# Patient Record
Sex: FEMALE | Race: WHITE | Employment: UNEMPLOYED | URBAN - METROPOLITAN AREA
[De-identification: names, ages, dates, MRNs, and addresses within clinical notes are randomized per-mention and may not be internally consistent; named-entity substitution may affect disease eponyms.]

---

## 2017-01-06 PROBLEM — N63.0 BREAST MASS IN FEMALE: Status: ACTIVE | Noted: 2017-01-06

## 2017-01-06 PROBLEM — N81.6 RECTOCELE: Status: ACTIVE | Noted: 2017-01-06

## 2017-01-10 ENCOUNTER — HOSPITAL ENCOUNTER (OUTPATIENT)
Dept: MAMMOGRAPHY | Age: 57
Discharge: HOME | End: 2017-01-10
Attending: OBSTETRICS & GYNECOLOGY
Payer: COMMERCIAL

## 2017-01-10 DIAGNOSIS — N64.4 BREAST TENDERNESS: ICD-10-CM

## 2017-01-10 DIAGNOSIS — N63.10 BREAST MASS, RIGHT: ICD-10-CM

## 2017-01-10 PROCEDURE — 76642 ULTRASOUND BREAST LIMITED: CPT

## 2017-01-10 PROCEDURE — 77066 DX MAMMO INCL CAD BI: CPT

## 2017-01-19 ENCOUNTER — HOSPITAL ENCOUNTER (OUTPATIENT)
Dept: MRI IMAGING | Age: 57
Discharge: HOME | End: 2017-01-19
Attending: OBSTETRICS & GYNECOLOGY
Payer: COMMERCIAL

## 2017-01-19 VITALS — BODY MASS INDEX: 20.67 KG/M2 | WEIGHT: 132 LBS

## 2017-01-19 DIAGNOSIS — N63.10 BREAST MASS, RIGHT: ICD-10-CM

## 2017-01-19 DIAGNOSIS — Z80.3 FAMILY HISTORY OF BREAST CANCER: ICD-10-CM

## 2017-01-19 DIAGNOSIS — N64.4 BREAST TENDERNESS: ICD-10-CM

## 2017-01-19 PROCEDURE — 77059 MRI BREAST BI W WO CONT: CPT

## 2017-01-19 PROCEDURE — 74011000258 HC RX REV CODE- 258: Performed by: OBSTETRICS & GYNECOLOGY

## 2017-01-19 PROCEDURE — A9577 INJ MULTIHANCE: HCPCS | Performed by: OBSTETRICS & GYNECOLOGY

## 2017-01-19 PROCEDURE — 74011250636 HC RX REV CODE- 250/636: Performed by: OBSTETRICS & GYNECOLOGY

## 2017-01-19 RX ORDER — SODIUM CHLORIDE 0.9 % (FLUSH) 0.9 %
10 SYRINGE (ML) INJECTION
Status: COMPLETED | OUTPATIENT
Start: 2017-01-19 | End: 2017-01-19

## 2017-01-19 RX ADMIN — SODIUM CHLORIDE 100 ML: 900 INJECTION, SOLUTION INTRAVENOUS at 10:00

## 2017-01-19 RX ADMIN — Medication 10 ML: at 10:00

## 2017-01-19 RX ADMIN — GADOBENATE DIMEGLUMINE 12 ML: 529 INJECTION, SOLUTION INTRAVENOUS at 10:00

## 2017-08-22 PROBLEM — N81.2 UTEROVAGINAL PROLAPSE, INCOMPLETE: Status: ACTIVE | Noted: 2017-08-22

## 2018-12-27 ENCOUNTER — HOSPITAL ENCOUNTER (OUTPATIENT)
Dept: MAMMOGRAPHY | Age: 58
Discharge: HOME | End: 2018-12-27
Attending: OBSTETRICS & GYNECOLOGY
Payer: COMMERCIAL

## 2018-12-27 DIAGNOSIS — Z12.31 ENCOUNTER FOR SCREENING MAMMOGRAM FOR MALIGNANT NEOPLASM OF BREAST: ICD-10-CM

## 2018-12-27 PROCEDURE — 77067 SCR MAMMO BI INCL CAD: CPT

## 2018-12-31 PROBLEM — K21.9 GERD (GASTROESOPHAGEAL REFLUX DISEASE): Status: ACTIVE | Noted: 2018-12-31

## 2018-12-31 PROBLEM — M13.0 POLYARTHRITIS: Status: ACTIVE | Noted: 2018-12-31

## 2018-12-31 PROBLEM — G44.229 CHRONIC TENSION-TYPE HEADACHE, NOT INTRACTABLE: Status: ACTIVE | Noted: 2018-12-31

## 2018-12-31 PROBLEM — L70.8 OTHER ACNE: Status: ACTIVE | Noted: 2018-12-31

## 2019-08-15 PROBLEM — G47.30 SLEEP APNEA: Status: ACTIVE | Noted: 2019-08-15

## 2020-11-17 ENCOUNTER — TRANSCRIBE ORDER (OUTPATIENT)
Dept: SCHEDULING | Age: 60
End: 2020-11-17

## 2020-11-17 DIAGNOSIS — Z12.31 SCREENING MAMMOGRAM FOR HIGH-RISK PATIENT: Primary | ICD-10-CM

## 2021-03-29 ENCOUNTER — HOSPITAL ENCOUNTER (OUTPATIENT)
Dept: MAMMOGRAPHY | Age: 61
Discharge: HOME | End: 2021-03-29
Attending: OBSTETRICS & GYNECOLOGY
Payer: COMMERCIAL

## 2021-03-29 DIAGNOSIS — Z12.31 SCREENING MAMMOGRAM FOR HIGH-RISK PATIENT: ICD-10-CM

## 2021-03-29 PROCEDURE — 77063 BREAST TOMOSYNTHESIS BI: CPT

## 2021-03-31 ENCOUNTER — APPOINTMENT (RX ONLY)
Dept: URBAN - METROPOLITAN AREA CLINIC 23 | Facility: CLINIC | Age: 61
Setting detail: DERMATOLOGY
End: 2021-03-31

## 2021-03-31 DIAGNOSIS — D18.0 HEMANGIOMA: ICD-10-CM

## 2021-03-31 DIAGNOSIS — Z71.89 OTHER SPECIFIED COUNSELING: ICD-10-CM

## 2021-03-31 DIAGNOSIS — Z87.2 PERSONAL HISTORY OF DISEASES OF THE SKIN AND SUBCUTANEOUS TISSUE: ICD-10-CM

## 2021-03-31 DIAGNOSIS — L94.0 LOCALIZED SCLERODERMA [MORPHEA]: ICD-10-CM

## 2021-03-31 DIAGNOSIS — Z48.02 ENCOUNTER FOR REMOVAL OF SUTURES: ICD-10-CM

## 2021-03-31 DIAGNOSIS — L57.8 OTHER SKIN CHANGES DUE TO CHRONIC EXPOSURE TO NONIONIZING RADIATION: ICD-10-CM

## 2021-03-31 PROBLEM — L70.0 ACNE VULGARIS: Status: ACTIVE | Noted: 2021-03-31

## 2021-03-31 PROBLEM — K21.9 GASTRO-ESOPHAGEAL REFLUX DISEASE WITHOUT ESOPHAGITIS: Status: ACTIVE | Noted: 2021-03-31

## 2021-03-31 PROBLEM — D18.01 HEMANGIOMA OF SKIN AND SUBCUTANEOUS TISSUE: Status: ACTIVE | Noted: 2021-03-31

## 2021-03-31 PROBLEM — M12.9 ARTHROPATHY, UNSPECIFIED: Status: ACTIVE | Noted: 2021-03-31

## 2021-03-31 PROCEDURE — 99204 OFFICE O/P NEW MOD 45 MIN: CPT

## 2021-03-31 PROCEDURE — ? PRESCRIPTION

## 2021-03-31 PROCEDURE — ? SUNSCREEN RECOMMENDATIONS

## 2021-03-31 PROCEDURE — ? COUNSELING

## 2021-03-31 RX ORDER — TACROLIMUS 1 MG/G
OINTMENT TOPICAL
Qty: 1 | Refills: 2 | Status: ERX | COMMUNITY
Start: 2021-03-31

## 2021-03-31 RX ADMIN — TACROLIMUS: 1 OINTMENT TOPICAL at 00:00

## 2021-03-31 ASSESSMENT — LOCATION DETAILED DESCRIPTION DERM
LOCATION DETAILED: INFERIOR THORACIC SPINE
LOCATION DETAILED: RIGHT LATERAL SUPERIOR CHEST
LOCATION DETAILED: RIGHT POSTERIOR SHOULDER
LOCATION DETAILED: LEFT MEDIAL UPPER BACK
LOCATION DETAILED: LEFT ULNAR PALM
LOCATION DETAILED: PERIUMBILICAL SKIN
LOCATION DETAILED: LEFT POSTERIOR SHOULDER

## 2021-03-31 ASSESSMENT — LOCATION SIMPLE DESCRIPTION DERM
LOCATION SIMPLE: LEFT UPPER BACK
LOCATION SIMPLE: RIGHT SHOULDER
LOCATION SIMPLE: UPPER BACK
LOCATION SIMPLE: LEFT SHOULDER
LOCATION SIMPLE: CHEST
LOCATION SIMPLE: ABDOMEN
LOCATION SIMPLE: LEFT HAND

## 2021-03-31 ASSESSMENT — LOCATION ZONE DERM
LOCATION ZONE: HAND
LOCATION ZONE: TRUNK
LOCATION ZONE: ARM

## 2021-03-31 NOTE — PROCEDURE: COUNSELING
Detail Level: Detailed
Detail Level: Simple
Detail Level: Zone
Sunscreen Recommendations: Broad Spectrum

## 2021-03-31 NOTE — HPI: RASH
What Type Of Note Output Would You Prefer (Optional)?: Standard Output
How Severe Is Your Rash?: moderate
Is This A New Presentation, Or A Follow-Up?: Rash
Additional History: Has used Elidel, and Propiogenta

## 2021-03-31 NOTE — PROCEDURE: REASSURANCE
Additional Notes (Optional): She had carpal tunnel surgery less than a month ago in [facility]. I reassured her.
Detail Level: Detailed
Hide Additional Notes?: No

## 2021-04-05 ENCOUNTER — HOSPITAL ENCOUNTER (OUTPATIENT)
Dept: MAMMOGRAPHY | Age: 61
Discharge: HOME | End: 2021-04-05
Attending: OBSTETRICS & GYNECOLOGY
Payer: COMMERCIAL

## 2021-04-05 ENCOUNTER — HOSPITAL ENCOUNTER (OUTPATIENT)
Dept: MRI IMAGING | Age: 61
Discharge: HOME | End: 2021-04-05
Attending: OBSTETRICS & GYNECOLOGY
Payer: COMMERCIAL

## 2021-04-05 DIAGNOSIS — R92.2 DENSE BREAST TISSUE: ICD-10-CM

## 2021-04-05 DIAGNOSIS — R92.8 ABNORMAL SCREENING MAMMOGRAM: ICD-10-CM

## 2021-04-05 PROCEDURE — 77065 DX MAMMO INCL CAD UNI: CPT

## 2021-04-05 PROCEDURE — 74011000258 HC RX REV CODE- 258: Performed by: OBSTETRICS & GYNECOLOGY

## 2021-04-05 PROCEDURE — 77049 MRI BREAST C-+ W/CAD BI: CPT

## 2021-04-05 PROCEDURE — A9576 INJ PROHANCE MULTIPACK: HCPCS | Performed by: OBSTETRICS & GYNECOLOGY

## 2021-04-05 PROCEDURE — 74011636320 HC RX REV CODE- 636/320: Performed by: OBSTETRICS & GYNECOLOGY

## 2021-04-05 RX ORDER — SODIUM CHLORIDE 0.9 % (FLUSH) 0.9 %
10 SYRINGE (ML) INJECTION
Status: COMPLETED | OUTPATIENT
Start: 2021-04-05 | End: 2021-04-05

## 2021-04-05 RX ADMIN — Medication 10 ML: at 16:23

## 2021-04-05 RX ADMIN — SODIUM CHLORIDE 100 ML: 900 INJECTION, SOLUTION INTRAVENOUS at 16:23

## 2021-04-05 RX ADMIN — GADOTERIDOL 11 ML: 279.3 INJECTION, SOLUTION INTRAVENOUS at 16:24

## 2021-04-08 ENCOUNTER — HOSPITAL ENCOUNTER (OUTPATIENT)
Dept: MAMMOGRAPHY | Age: 61
Discharge: HOME | End: 2021-04-08
Attending: OBSTETRICS & GYNECOLOGY
Payer: COMMERCIAL

## 2021-04-08 VITALS — SYSTOLIC BLOOD PRESSURE: 121 MMHG | DIASTOLIC BLOOD PRESSURE: 63 MMHG | OXYGEN SATURATION: 98 % | HEART RATE: 72 BPM

## 2021-04-08 DIAGNOSIS — R92.8 ABNORMAL MAMMOGRAM OF LEFT BREAST: ICD-10-CM

## 2021-04-08 PROCEDURE — 88305 TISSUE EXAM BY PATHOLOGIST: CPT

## 2021-04-08 PROCEDURE — 74011250636 HC RX REV CODE- 250/636: Performed by: OBSTETRICS & GYNECOLOGY

## 2021-04-08 PROCEDURE — 19081 BX BREAST 1ST LESION STRTCTC: CPT

## 2021-04-08 PROCEDURE — 74011000250 HC RX REV CODE- 250: Performed by: OBSTETRICS & GYNECOLOGY

## 2021-04-08 PROCEDURE — 77065 DX MAMMO INCL CAD UNI: CPT

## 2021-04-08 RX ORDER — LIDOCAINE HYDROCHLORIDE AND EPINEPHRINE 10; 10 MG/ML; UG/ML
10 INJECTION, SOLUTION INFILTRATION; PERINEURAL
Status: COMPLETED | OUTPATIENT
Start: 2021-04-08 | End: 2021-04-08

## 2021-04-08 RX ORDER — LIDOCAINE HYDROCHLORIDE 10 MG/ML
5 INJECTION INFILTRATION; PERINEURAL
Status: COMPLETED | OUTPATIENT
Start: 2021-04-08 | End: 2021-04-08

## 2021-04-08 RX ORDER — LIDOCAINE HYDROCHLORIDE AND EPINEPHRINE 10; 10 MG/ML; UG/ML
5 INJECTION, SOLUTION INFILTRATION; PERINEURAL
Status: COMPLETED | OUTPATIENT
Start: 2021-04-08 | End: 2021-04-08

## 2021-04-08 RX ADMIN — LIDOCAINE HYDROCHLORIDE,EPINEPHRINE BITARTRATE 10 ML: 10; .01 INJECTION, SOLUTION INFILTRATION; PERINEURAL at 12:08

## 2021-04-08 RX ADMIN — LIDOCAINE HYDROCHLORIDE,EPINEPHRINE BITARTRATE 4 ML: 10; .01 INJECTION, SOLUTION INFILTRATION; PERINEURAL at 12:08

## 2021-04-08 RX ADMIN — LIDOCAINE HYDROCHLORIDE 1.5 ML: 10 INJECTION, SOLUTION INFILTRATION; PERINEURAL at 12:07

## 2021-04-08 RX ADMIN — SODIUM CHLORIDE 250 ML: 900 INJECTION, SOLUTION INTRAVENOUS at 12:08

## 2021-08-30 ENCOUNTER — APPOINTMENT (RX ONLY)
Dept: URBAN - METROPOLITAN AREA CLINIC 23 | Facility: CLINIC | Age: 61
Setting detail: DERMATOLOGY
End: 2021-08-30

## 2021-08-30 DIAGNOSIS — L30.9 DERMATITIS, UNSPECIFIED: ICD-10-CM

## 2021-08-30 PROBLEM — K21.9 GASTRO-ESOPHAGEAL REFLUX DISEASE WITHOUT ESOPHAGITIS: Status: ACTIVE | Noted: 2021-08-30

## 2021-08-30 PROCEDURE — ? PRESCRIPTION MEDICATION MANAGEMENT

## 2021-08-30 PROCEDURE — 99213 OFFICE O/P EST LOW 20 MIN: CPT

## 2021-08-30 PROCEDURE — ? COUNSELING

## 2021-08-30 PROCEDURE — ? PRESCRIPTION

## 2021-08-30 RX ORDER — HYDROCORTISONE 25 MG/G
CREAM TOPICAL
Qty: 30 | Refills: 2 | Status: ERX | COMMUNITY
Start: 2021-08-30

## 2021-08-30 RX ADMIN — HYDROCORTISONE: 25 CREAM TOPICAL at 00:00

## 2021-08-30 ASSESSMENT — LOCATION SIMPLE DESCRIPTION DERM: LOCATION SIMPLE: RIGHT LIP

## 2021-08-30 ASSESSMENT — LOCATION ZONE DERM: LOCATION ZONE: LIP

## 2021-08-30 ASSESSMENT — LOCATION DETAILED DESCRIPTION DERM: LOCATION DETAILED: RIGHT UPPER CUTANEOUS LIP

## 2021-08-30 NOTE — PROCEDURE: PRESCRIPTION MEDICATION MANAGEMENT
Initiate Treatment: Hydrocortisone twice daily
Detail Level: Simple
Render In Strict Bullet Format?: No

## 2021-09-29 ENCOUNTER — APPOINTMENT (RX ONLY)
Dept: URBAN - METROPOLITAN AREA CLINIC 23 | Facility: CLINIC | Age: 61
Setting detail: DERMATOLOGY
End: 2021-09-29

## 2021-09-29 DIAGNOSIS — L71.8 OTHER ROSACEA: ICD-10-CM

## 2021-09-29 DIAGNOSIS — L57.8 OTHER SKIN CHANGES DUE TO CHRONIC EXPOSURE TO NONIONIZING RADIATION: ICD-10-CM

## 2021-09-29 DIAGNOSIS — L30.9 DERMATITIS, UNSPECIFIED: ICD-10-CM

## 2021-09-29 DIAGNOSIS — Z71.89 OTHER SPECIFIED COUNSELING: ICD-10-CM

## 2021-09-29 DIAGNOSIS — L81.4 OTHER MELANIN HYPERPIGMENTATION: ICD-10-CM

## 2021-09-29 DIAGNOSIS — L82.1 OTHER SEBORRHEIC KERATOSIS: ICD-10-CM

## 2021-09-29 DIAGNOSIS — L20.89 OTHER ATOPIC DERMATITIS: ICD-10-CM | Status: STABLE

## 2021-09-29 PROBLEM — M12.9 ARTHROPATHY, UNSPECIFIED: Status: ACTIVE | Noted: 2021-09-29

## 2021-09-29 PROBLEM — L20.84 INTRINSIC (ALLERGIC) ECZEMA: Status: ACTIVE | Noted: 2021-09-29

## 2021-09-29 PROCEDURE — ? SUNSCREEN RECOMMENDATIONS

## 2021-09-29 PROCEDURE — 99214 OFFICE O/P EST MOD 30 MIN: CPT

## 2021-09-29 PROCEDURE — ? PRESCRIPTION MEDICATION MANAGEMENT

## 2021-09-29 PROCEDURE — ? PRESCRIPTION

## 2021-09-29 PROCEDURE — ? COUNSELING

## 2021-09-29 RX ORDER — METRONIDAZOLE 7.5 MG/G
CREAM TOPICAL
Qty: 45 | Refills: 2 | Status: ERX | COMMUNITY
Start: 2021-09-29

## 2021-09-29 RX ADMIN — METRONIDAZOLE: 7.5 CREAM TOPICAL at 00:00

## 2021-09-29 ASSESSMENT — LOCATION SIMPLE DESCRIPTION DERM
LOCATION SIMPLE: RIGHT CHEEK
LOCATION SIMPLE: RIGHT LIP
LOCATION SIMPLE: LEFT UPPER BACK
LOCATION SIMPLE: LEFT EYEBROW
LOCATION SIMPLE: RIGHT HAND
LOCATION SIMPLE: LEFT HAND
LOCATION SIMPLE: RIGHT NOSE
LOCATION SIMPLE: LEFT CHEEK

## 2021-09-29 ASSESSMENT — LOCATION DETAILED DESCRIPTION DERM
LOCATION DETAILED: LEFT INFERIOR CENTRAL MALAR CHEEK
LOCATION DETAILED: RIGHT THENAR EMINENCE
LOCATION DETAILED: RIGHT SUPERIOR VERMILION LIP
LOCATION DETAILED: LEFT LATERAL EYEBROW
LOCATION DETAILED: LEFT MEDIAL UPPER BACK
LOCATION DETAILED: RIGHT INFERIOR MEDIAL MALAR CHEEK
LOCATION DETAILED: RIGHT NASAL ALAR GROOVE
LOCATION DETAILED: LEFT THENAR EMINENCE

## 2021-09-29 ASSESSMENT — LOCATION ZONE DERM
LOCATION ZONE: LIP
LOCATION ZONE: TRUNK
LOCATION ZONE: FACE
LOCATION ZONE: HAND
LOCATION ZONE: NOSE

## 2021-09-29 NOTE — PROCEDURE: COUNSELING
Detail Level: Detailed
Sunscreen Recommendations: Broad Spectrum
Detail Level: Zone
Detail Level: Simple
Detail Level: Generalized

## 2021-09-29 NOTE — PROCEDURE: PRESCRIPTION MEDICATION MANAGEMENT
Continue Regimen: Protopic prn
Detail Level: Simple
Plan: Can use the protopic for flares.
Render In Strict Bullet Format?: No

## 2021-11-09 ENCOUNTER — TRANSCRIBE ORDER (OUTPATIENT)
Dept: SCHEDULING | Age: 61
End: 2021-11-09

## 2021-11-09 DIAGNOSIS — N60.92 INTRADUCTAL HYPERPLASIA WITHOUT ATYPIA OF LEFT BREAST: ICD-10-CM

## 2021-11-09 DIAGNOSIS — Z12.31 ENCOUNTER FOR SCREENING MAMMOGRAM FOR MALIGNANT NEOPLASM OF BREAST: Primary | ICD-10-CM

## 2022-02-09 ENCOUNTER — HOSPITAL ENCOUNTER (OUTPATIENT)
Dept: MAMMOGRAPHY | Age: 62
Discharge: HOME | End: 2022-02-09
Attending: OBSTETRICS & GYNECOLOGY
Payer: COMMERCIAL

## 2022-02-09 DIAGNOSIS — N64.4 PAIN OF LEFT BREAST: ICD-10-CM

## 2022-02-09 PROCEDURE — 77062 BREAST TOMOSYNTHESIS BI: CPT

## 2022-02-09 PROCEDURE — 76642 ULTRASOUND BREAST LIMITED: CPT

## 2022-03-18 PROBLEM — L70.8 OTHER ACNE: Status: ACTIVE | Noted: 2018-12-31

## 2022-03-18 PROBLEM — G47.30 SLEEP APNEA: Status: ACTIVE | Noted: 2019-08-15

## 2022-03-19 PROBLEM — N81.2 UTEROVAGINAL PROLAPSE, INCOMPLETE: Status: ACTIVE | Noted: 2017-08-22

## 2022-03-19 PROBLEM — N81.6 RECTOCELE: Status: ACTIVE | Noted: 2017-01-06

## 2022-03-19 PROBLEM — N63.0 BREAST MASS IN FEMALE: Status: ACTIVE | Noted: 2017-01-06

## 2022-03-19 PROBLEM — K21.9 GERD (GASTROESOPHAGEAL REFLUX DISEASE): Status: ACTIVE | Noted: 2018-12-31

## 2022-03-19 PROBLEM — G44.229 CHRONIC TENSION-TYPE HEADACHE, NOT INTRACTABLE: Status: ACTIVE | Noted: 2018-12-31

## 2022-03-19 PROBLEM — M13.0 POLYARTHRITIS: Status: ACTIVE | Noted: 2018-12-31

## 2022-06-20 DIAGNOSIS — Z80.42 FAMILY HISTORY OF MALIGNANT NEOPLASM OF PROSTATE: Primary | ICD-10-CM

## 2022-06-20 DIAGNOSIS — Z80.3 FAMILY HISTORY OF MALIGNANT NEOPLASM OF BREAST: ICD-10-CM

## 2022-06-20 NOTE — PROGRESS NOTES
Ms. Ting Lyndon called and let me know she would like to go forward with genetic testing. I reviewed billing, sample, and legal logistics and then we chose what type of testing she would like. She opted to proceed with CancerNext-Expanded+RNAinsight and to come in for a blood draw on Tuesday the 28th at 1. I encouraged her to reach out with any further questions.

## 2022-06-28 ENCOUNTER — HOSPITAL ENCOUNTER (OUTPATIENT)
Dept: LAB | Age: 62
Discharge: HOME | End: 2022-07-01
Payer: COMMERCIAL

## 2022-06-28 DIAGNOSIS — Z80.42 FAMILY HISTORY OF MALIGNANT NEOPLASM OF PROSTATE: ICD-10-CM

## 2022-06-28 DIAGNOSIS — Z80.3 FAMILY HISTORY OF MALIGNANT NEOPLASM OF BREAST: ICD-10-CM

## 2022-06-28 LAB
Lab: NORMAL
Lab: NORMAL
REFERENCE LAB: NORMAL

## 2022-06-28 PROCEDURE — 36415 COLL VENOUS BLD VENIPUNCTURE: CPT

## 2022-07-11 ENCOUNTER — CLINICAL DOCUMENTATION (OUTPATIENT)
Dept: ONCOLOGY | Age: 62
End: 2022-07-11

## 2022-07-11 NOTE — PROGRESS NOTES
Patient: Daria Cadnea  YOB: 1960    Location: [facility] HEMATOLOGY AND ONCOLOGY  Provider: Gunnar Cho MS [facility], you were previously referred by Vidhya Avery DO for an initial genetic consultation due to your family history of breast cancer. You were seen on March 10th, 2022 and later consented to genetic testing, which was sent to [facility]. We spoke to go over the results of this testing on July 11th, 2022. This letter is a summary of the results. Results    Following discussion of your personal and family history we discussed the benefits, limitations and possible impacts of genetic testing and you pursued a panel called CancerNext-Expanded+[employer] looking at 68 genes in which pathogenic variants (harmful genetic differences) are related to increased risk for multiple cancer types. The genes included on this panel were: AIP, ALK, APC, KANIKA, AXIN2, BAP1, BARD1, BLM, BMPR1A, BRCA1, BRCA2, BRIP1, CDC73, CDH1, CDK4, CDKN1B, CDKN2A, CHEK2, CTNNA1, DICER1, FANCC, FH, FLCN, GALNT12, KIF1B, LZTR1, MAX, MEN1, MET, MLH1, MSH2, MSH3, MSH6, MUTYH, NBN, NF1, NF2, NTHL1, PALB2, PHOX2B, PMS2, POT1, QPASZ0C, PTCH1, PTEN, RAD51C, RAD51D, RB1, RECQL, RET, SDHA, SDHAF2, SDHB, SDHC, SDHD, SMAD4, SMARCA4, SMARCB1, SMARCE1, STK11, SUFU, OJHY870, TP53, TSC1, TSC2, VHL, XRCC2, EGFR, EGLN1, HOXB13, KIT, MITF, PDGFRA, POLD1, POLE,  EPCAM and GREM1. This testing did not identify any pathogenic variants (harmful genetic differences that increase cancer risk). The laboratory identified a variant of uncertain significance (VUS) in the PALB2 gene, specifically p.L100F, also written as c.298C>T. \"Variant of uncertain significance\" means that this genetic difference or variant has not been seen enough to know whether it is a harmful difference that increases cancer risk, or a harmless difference that some individuals have.  \"c.298\" is like an address, telling us exactly where this genetic difference is located. \"C>T\" tells us in the genetic material there was a misspelling where a \"C\" was replaced with a \"T\". Additionally, the test report states that you are heterozygous for this variant of uncertain significance. This means that of your two copies of the PALB2 gene (one from your mother and one from your father), only one of them has this variant. Having one pathogenic variant in [place] is associated with an increased risk for breast cancer, ovarian cancer and pancreatic cancer. Having two pathogenic variants in PALB2 (one from each parent) is associated with a genetic condition called fanconi anemia type N. This is characterized by increased risk for childhood cancers, abnormalities in how some body parts form, and increased risk for bone marrow failure. It is important to note that because your variant is classified as a variant of uncertain significance at this time, this result does not mean that you are at increased risk for the conditions outlined above. Because we do not know enough about this specific variant, we do not recommend making any changes to medical management based on this finding. We recommend that medical management be determined based on your personal and family history. Recommended screening based on this is outlined in the next section of this letter. It is possible that over time, as more people are tested and this variant is seen more often, we may be able to \"reclassify\" this variant and say whether it is a harmful or harmless variant. Because of this possibility, we recommend that you check in with our office periodically, and make sure to update us should any of your contact information change. Screening Recommendations  As discussed in our initial appointment, a majority of cancer is sporadic, or arises by chance.  Because of this, we know that you are still at risk to develop cancer, and that it is important that you continue the recommended cancer screenings. I utilized a computer model called Dane Viera to estimate your lifetime risk for breast cancer based on your personal and family history, and your test results. This model estimated you to be at increased risk to develop breast cancer based on [employer] (NCCN standards). This means you qualify for specific breast cancer screenings. These include:   Clinical breast exam every 6-12 months   Annual screening mammogram starting 10 years before the earliest cancer diagnosis in the family or age 36 (whichever comes first)  Rasmussen Annual breast MRI starting 10 years before the earliest cancer diagnosis in the family or age 36 (whichever comes first)   Consideration of medication to reduce breast cancer risk   General breast awareness      Based on [employer] (NCCN) guidelines it is recommended that you follow routine surveillance and screening guidelines for other types of cancer including colorectal cancer screening via colonoscopy in 2025 and regular gynecological exams2,3. It is important to share these testing results and discuss guidelines with your healthcare team so that they can be aware if guidelines should change, and so they can continue to recommend screening based on your history. Lastly, it is important to note that certain lifestyle modifications can be made to help lower cancer risk. These include regular exercise, maintaining a diet high in fruits and vegetables and low in processed foods and red meat, limiting alcohol consumption, avoidance of smoking and maintaining a healthy weight2. Inheritance and family members  There is a 1 in 2 (50%) chance that your siblings  also have this variant of uncertain significance. At this time, we do not recommend testing family members for this variant in order to influence medical management, as no changes to medical management are suggested based on this finding.   Your results report includes a section that mentions a family studies program that the laboratory offers in order to try and clarify VUS results. Application to take part in this program is not a guarantee that your family would be approved to participate, and participation in the program is not a guarantee that your variant would be reclassified. If you have more questions about this program, please feel free to reach out. I have included a copy of your results with this letter. I have also forwarded a copy of this letter to Dr. Bossman Silva to help coordinate your ongoing surveillance and care. A copy of your test report is attached to your records for your [facility] and [facility] providers to view. It was a pleasure to speak with you, please reach out if you have any questions or would like to arrange for an appointment to go over these results in more detail. Sincerely,   Diane Gaviria MS, [facility]  Certified Genetic Counselor  [phone]    Sources used:  1. [facility] ([id number]). Breast Cancer Screening and Diagnosis (version 1.2021). Retrieved from [facility] [employer].no. pdf  2. [facility] ([id number]). Colorectal Cancer Screening (version 1.2021). Retrieved from [facility].is. pdf  3. Asif Smith, PhD, Gabrielle Maier MD, Kris Joshi MD, Edi Waldron MD, Demetrice Cantu, PhD, Elgin Dobbs MD, Kay Thrasher MD, MPH, Mauro Purcell MD, David Smith. Shaggy Shepard MD, MPH, [facility]. Dwayne Andrews MD, Jj Millan MD, PhD, Adine Newton. Rebecca Monae MD, Dalton Reese MD, Ricky Melchor MD, [address] Rosanne Camargo. Jonathan De La O MD, Yair Bullock MD, Job Galindo. Urszula Moss, PhD, MPH, Gracie Bernabe.  Lyndsay Epperson MD, MPH, [address], [phone] for Colposcopy and Cervical Pathology, and American Society for Clinical Pathology Screening Guidelines for the Prevention and Early Detection of Cervical Cancer, American Journal of Clinical Pathology, Volume 137, Issue 4, April 2012, Pages 192-129, https://doi.org/10.1309/[email or web address]     SUMMARY:              Naun Guardado previously consented to genetic testing, and it was sent to [facility] laboratories for the CancerNext-Expanded+RNAinsight panel. Results showed a variant of uncertain significance in the PALB2 gene, and no clinically actionable findings.     CC:   Alicia Vela DO  Chart

## 2022-07-25 ENCOUNTER — CLINICAL DOCUMENTATION (OUTPATIENT)
Dept: ONCOLOGY | Age: 62
End: 2022-07-25

## 2022-07-25 NOTE — PROGRESS NOTES
Ms. Brigitte Cummins called alerting me to the fact that she had gotten notification that insurance was going to deny her testing and that she was worried about her bill. I called [facility] and they told me her cost of testing would not be over $100. I then called Ms. Brigitte Cummins and communicated this to her. She asked for written proof of this and I reached out to my [employer] rep, who then asked for an email. I called Ms. Brigitte Cummins and got her preferred email and gave it to my rep.

## 2022-08-05 DIAGNOSIS — Z80.3 FAMILY HISTORY OF BREAST CANCER IN FIRST DEGREE RELATIVE: ICD-10-CM

## 2022-08-05 DIAGNOSIS — Z80.3 FAMILY HISTORY OF BREAST CANCER IN SISTER: ICD-10-CM

## 2022-08-05 DIAGNOSIS — N60.02 CYST OF LEFT BREAST: ICD-10-CM

## 2022-08-05 DIAGNOSIS — Z98.890 HISTORY OF BREAST BIOPSY: ICD-10-CM

## 2022-08-05 DIAGNOSIS — R92.8 ABNORMALITY OF BREAST ON SCREENING MAMMOGRAPHY: Primary | ICD-10-CM

## 2022-08-05 DIAGNOSIS — N64.4 BREAST PAIN: ICD-10-CM

## 2022-08-17 ENCOUNTER — HOSPITAL ENCOUNTER (OUTPATIENT)
Dept: MRI IMAGING | Age: 62
Discharge: HOME | End: 2022-08-20
Payer: COMMERCIAL

## 2022-08-17 DIAGNOSIS — N60.02 CYST OF LEFT BREAST: ICD-10-CM

## 2022-08-17 DIAGNOSIS — Z98.890 HISTORY OF BREAST BIOPSY: ICD-10-CM

## 2022-08-17 DIAGNOSIS — N64.4 BREAST PAIN: ICD-10-CM

## 2022-08-17 DIAGNOSIS — Z80.3 FAMILY HISTORY OF BREAST CANCER IN FIRST DEGREE RELATIVE: ICD-10-CM

## 2022-08-17 DIAGNOSIS — R92.8 ABNORMALITY OF BREAST ON SCREENING MAMMOGRAPHY: ICD-10-CM

## 2022-08-17 DIAGNOSIS — Z80.3 FAMILY HISTORY OF BREAST CANCER IN SISTER: ICD-10-CM

## 2022-08-17 PROCEDURE — A9579 GAD-BASE MR CONTRAST NOS,1ML: HCPCS | Performed by: OBSTETRICS & GYNECOLOGY

## 2022-08-17 PROCEDURE — 6360000004 HC RX CONTRAST MEDICATION: Performed by: OBSTETRICS & GYNECOLOGY

## 2022-08-17 PROCEDURE — C8908 MRI W/O FOL W/CONT, BREAST,: HCPCS

## 2022-08-17 RX ADMIN — GADOTERIDOL 10 ML: 279.3 INJECTION, SOLUTION INTRAVENOUS at 15:01

## 2022-10-03 ENCOUNTER — CLINICAL DOCUMENTATION (OUTPATIENT)
Dept: ONCOLOGY | Age: 62
End: 2022-10-03

## 2022-10-03 NOTE — PROGRESS NOTES
HEREDIT[facility] CAN[facility]ER [facility]INIC - RESULT NOTE   Date:   10/3/2022       Patient[de-identified] Eric Bashir   YOB: 1960       Providers: Fox Morgan, MS Eric Bashir was contacted via telephone today regarding a reclassification of their prior genetic testing results. She was initially seen in March 2022 regarding a family history of breast cancer. Genetic testing including a panel of 77 genes ([employer] CancerNext-Expanded+[employer]) was performed and identified a variant of uncertain significance (VUS) I nthe PALB2 gene. A new report was recently released and the laboratory has reclassified the VUS in the PALB2 gene as likely benign. 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 INTERPRETATION   A VUS is a rare change in a gene that may or may not be associated with an increased risk of developing cancer. In many cases, VUS findings are later determined to be benign variations that are not associated with an increased risk of developing cancer. After these VUS findings are identified, the laboratory will continue to collect data about this VUS from other families who are identified as carrying the same gene change. Often times, enough clinical information will be obtained and the laboratory will issue a new report with a variant reclassified as a pathogenic variant or a benign variation. Known pathogenic variants in the PALB2 gene are associated with an increased risk for breast cancer, ovarian cancer, and pancreatic cancer. The VUS detected in the PALB2 gene has been reclassified as likely benign, meaning there is not an increased risk for these cancers. If Alli Harris has any questions regarding this information we encourage her to contact our office.       RECOMMENDATIONS   We have no additional recommendations for management at this time, but encourage all family members to make their physicians aware of the family history and follow any screening recommendations provided. It has been our pleasure to work with Wesley Aldana and their physicians and we hope they will contact us if further questions or concerns arise.     Jinny Lehman MS  Genetic Counselor  [phone]    Enclosure for patient: copy of test result  A copy of the test result will also be scanned into the Labs tab of Epic

## 2022-11-09 ENCOUNTER — OFFICE VISIT (OUTPATIENT)
Dept: OBGYN CLINIC | Age: 62
End: 2022-11-09
Payer: COMMERCIAL

## 2022-11-09 VITALS — SYSTOLIC BLOOD PRESSURE: 128 MMHG | DIASTOLIC BLOOD PRESSURE: 70 MMHG | BODY MASS INDEX: 17.7 KG/M2 | WEIGHT: 113 LBS

## 2022-11-09 DIAGNOSIS — R63.4 WEIGHT LOSS: Primary | ICD-10-CM

## 2022-11-09 DIAGNOSIS — R59.9 SWOLLEN LYMPH NODES: ICD-10-CM

## 2022-11-09 DIAGNOSIS — R59.9 LYMPH NODE ENLARGEMENT: ICD-10-CM

## 2022-11-09 PROCEDURE — 99214 OFFICE O/P EST MOD 30 MIN: CPT | Performed by: OBSTETRICS & GYNECOLOGY

## 2022-11-09 RX ORDER — PANTOPRAZOLE SODIUM 20 MG/1
20 TABLET, DELAYED RELEASE ORAL DAILY
COMMUNITY

## 2022-11-09 RX ORDER — MONTELUKAST SODIUM 10 MG/1
10 TABLET ORAL NIGHTLY
COMMUNITY

## 2022-11-09 RX ORDER — ESTRADIOL 0.05 MG/D
1 FILM, EXTENDED RELEASE TRANSDERMAL
Qty: 8 PATCH | Refills: 11 | Status: SHIPPED | OUTPATIENT
Start: 2022-11-10

## 2022-11-09 RX ORDER — MAGNESIUM OXIDE 400 MG/1
400 TABLET ORAL DAILY
COMMUNITY

## 2022-11-09 RX ORDER — ZOLPIDEM TARTRATE 12.5 MG/1
12.5 TABLET, FILM COATED, EXTENDED RELEASE ORAL NIGHTLY PRN
Qty: 30 TABLET | Refills: 1 | Status: CANCELLED | OUTPATIENT
Start: 2022-11-09 | End: 2023-01-08

## 2022-11-09 RX ORDER — ESTRADIOL 0.05 MG/D
FILM, EXTENDED RELEASE TRANSDERMAL
Qty: 24 PATCH | Refills: 2 | OUTPATIENT
Start: 2022-11-09

## 2022-11-09 RX ORDER — BUTALBITAL, ACETAMINOPHEN AND CAFFEINE 50; 325; 40 MG/1; MG/1; MG/1
1 TABLET ORAL EVERY 4 HOURS PRN
Qty: 30 TABLET | Refills: 11 | Status: SHIPPED | OUTPATIENT
Start: 2022-11-09

## 2022-11-09 NOTE — PROGRESS NOTES
The patient is a 65 y.o. No obstetric history on file. who is seen for lump in the groin. Groin has been present since October. Has Covid in September. She has been seeing [facility] for primary care, but has retired. She has been having weight loss for the last year. She is seeing gastro and nutritionist.   HISTORY:    No obstetric history on file. No LMP recorded. Patient is postmenopausal.  Sexual History:  has sex with males  Contraception:  none  Current Outpatient Medications on File Prior to Visit   Medication Sig Dispense Refill    montelukast (SINGULAIR) 10 MG tablet Take 10 mg by mouth nightly      pantoprazole (PROTONIX) 20 MG tablet Take 20 mg by mouth daily      Simethicone (GAS-X PO) Take by mouth      magnesium oxide (MAG-OX) 400 MG tablet Take 400 mg by mouth daily      linaclotide (LINZESS) 145 MCG capsule Take 145 mcg by mouth every morning (before breakfast)      zolpidem (AMBIEN CR) 12.5 MG extended release tablet Take 12.5 mg by mouth.      estradiol (VIVELLE) 0.1 MG/24HR Place 1 patch onto the skin       No current facility-administered medications on file prior to visit. ROS:  Feeling well. No dyspnea or chest pain on exertion. No abdominal pain, change in bowel habits, black or bloody stools. No urinary tract symptoms. GYN ROS: no breast pain or new or enlarging lumps on self exam.    PHYSICAL EXAM:  Blood pressure 128/70, weight 113 lb (51.3 kg). The patient appears well, alert, oriented x 3, in no distress. Groin node 2 cm firm in left groin slightly tender  Significant weight loss       ASSESSMENT:    1. Weight loss  -     CBC with Auto Differential; Future  -     Comprehensive Metabolic Panel; Future  2. Swollen lymph nodes  -     CBC with Auto Differential; Future  -     Comprehensive Metabolic Panel; Future  3.  Lymph node enlargement     PLAN:  Check CBC  CMP  Consider imaging vs biopsy  Refill fernando Gonzales MD

## 2022-11-10 LAB
ALBUMIN SERPL-MCNC: 4.1 G/DL (ref 3.2–4.6)
ALBUMIN/GLOB SERPL: 1.5 {RATIO} (ref 0.4–1.6)
ALP SERPL-CCNC: 30 U/L (ref 50–136)
ALT SERPL-CCNC: 21 U/L (ref 12–65)
ANION GAP SERPL CALC-SCNC: 5 MMOL/L (ref 2–11)
AST SERPL-CCNC: 15 U/L (ref 15–37)
BASOPHILS # BLD: 0.1 K/UL (ref 0–0.2)
BASOPHILS NFR BLD: 1 % (ref 0–2)
BILIRUB SERPL-MCNC: 0.5 MG/DL (ref 0.2–1.1)
BUN SERPL-MCNC: 20 MG/DL (ref 8–23)
CALCIUM SERPL-MCNC: 9.1 MG/DL (ref 8.3–10.4)
CHLORIDE SERPL-SCNC: 106 MMOL/L (ref 101–110)
CO2 SERPL-SCNC: 29 MMOL/L (ref 21–32)
CREAT SERPL-MCNC: 0.8 MG/DL (ref 0.6–1)
DIFFERENTIAL METHOD BLD: ABNORMAL
EOSINOPHIL # BLD: 0.1 K/UL (ref 0–0.8)
EOSINOPHIL NFR BLD: 1 % (ref 0.5–7.8)
ERYTHROCYTE [DISTWIDTH] IN BLOOD BY AUTOMATED COUNT: 13.6 % (ref 11.9–14.6)
GLOBULIN SER CALC-MCNC: 2.7 G/DL (ref 2.8–4.5)
GLUCOSE SERPL-MCNC: 104 MG/DL (ref 65–100)
HCT VFR BLD AUTO: 37.7 % (ref 35.8–46.3)
HGB BLD-MCNC: 12.4 G/DL (ref 11.7–15.4)
IMM GRANULOCYTES # BLD AUTO: 0 K/UL (ref 0–0.5)
IMM GRANULOCYTES NFR BLD AUTO: 0 % (ref 0–5)
LYMPHOCYTES # BLD: 2.1 K/UL (ref 0.5–4.6)
LYMPHOCYTES NFR BLD: 33 % (ref 13–44)
MCH RBC QN AUTO: 31 PG (ref 26.1–32.9)
MCHC RBC AUTO-ENTMCNC: 32.9 G/DL (ref 31.4–35)
MCV RBC AUTO: 94.3 FL (ref 82–102)
MONOCYTES # BLD: 0.5 K/UL (ref 0.1–1.3)
MONOCYTES NFR BLD: 8 % (ref 4–12)
NEUTS SEG # BLD: 3.5 K/UL (ref 1.7–8.2)
NEUTS SEG NFR BLD: 57 % (ref 43–78)
NRBC # BLD: 0 K/UL (ref 0–0.2)
PLATELET # BLD AUTO: 203 K/UL (ref 150–450)
PMV BLD AUTO: 10.8 FL (ref 9.4–12.3)
POTASSIUM SERPL-SCNC: 3.9 MMOL/L (ref 3.5–5.1)
PROT SERPL-MCNC: 6.8 G/DL (ref 6.3–8.2)
RBC # BLD AUTO: 4 M/UL (ref 4.05–5.2)
SODIUM SERPL-SCNC: 140 MMOL/L (ref 133–143)
WBC # BLD AUTO: 6.2 K/UL (ref 4.3–11.1)

## 2022-11-11 DIAGNOSIS — R59.9 ENLARGED LYMPH NODE: Primary | ICD-10-CM

## 2022-11-11 DIAGNOSIS — Z13.21 ENCOUNTER FOR VITAMIN DEFICIENCY SCREENING: ICD-10-CM

## 2022-11-11 DIAGNOSIS — R63.4 WEIGHT LOSS: ICD-10-CM

## 2022-11-11 DIAGNOSIS — Z13.29 SCREENING FOR THYROID DISORDER: ICD-10-CM

## 2022-11-14 ENCOUNTER — NURSE ONLY (OUTPATIENT)
Dept: OBGYN CLINIC | Age: 62
End: 2022-11-14

## 2022-11-14 DIAGNOSIS — R59.9 ENLARGED LYMPH NODE: ICD-10-CM

## 2022-11-14 DIAGNOSIS — Z13.29 SCREENING FOR THYROID DISORDER: ICD-10-CM

## 2022-11-14 DIAGNOSIS — R63.4 WEIGHT LOSS: ICD-10-CM

## 2022-11-14 DIAGNOSIS — Z13.21 ENCOUNTER FOR VITAMIN DEFICIENCY SCREENING: ICD-10-CM

## 2022-11-14 LAB
T4 FREE SERPL-MCNC: 0.8 NG/DL (ref 0.78–1.46)
TSH, 3RD GENERATION: 0.71 UIU/ML (ref 0.36–3.74)
VIT B12 SERPL-MCNC: 414 PG/ML (ref 193–986)

## 2022-11-16 LAB — THYROPEROXIDASE AB SERPL-ACNC: 9 IU/ML (ref 0–34)

## 2022-11-17 LAB — VIT B6 SERPL-MCNC: 21.4 UG/L (ref 3.4–65.2)

## 2022-11-28 ENCOUNTER — TELEPHONE (OUTPATIENT)
Dept: OBGYN | Age: 62
End: 2022-11-28

## 2022-11-29 ENCOUNTER — HOSPITAL ENCOUNTER (OUTPATIENT)
Dept: CT IMAGING | Age: 62
Discharge: HOME | End: 2022-12-02
Payer: COMMERCIAL

## 2022-11-29 DIAGNOSIS — R63.4 WEIGHT LOSS: ICD-10-CM

## 2022-11-29 DIAGNOSIS — R59.9 ENLARGED LYMPH NODE: ICD-10-CM

## 2022-11-29 PROCEDURE — 6360000004 HC RX CONTRAST MEDICATION: Performed by: OBSTETRICS & GYNECOLOGY

## 2022-11-29 PROCEDURE — 74177 CT ABD & PELVIS W/CONTRAST: CPT

## 2022-11-29 RX ORDER — SODIUM CHLORIDE 0.9 % (FLUSH) 0.9 %
10 SYRINGE (ML) INJECTION
Status: DISCONTINUED | OUTPATIENT
Start: 2022-11-29 | End: 2022-12-03 | Stop reason: HOSPADM

## 2022-11-29 RX ORDER — 0.9 % SODIUM CHLORIDE 0.9 %
100 INTRAVENOUS SOLUTION INTRAVENOUS
Status: DISCONTINUED | OUTPATIENT
Start: 2022-11-29 | End: 2022-12-03 | Stop reason: HOSPADM

## 2022-11-29 RX ADMIN — DIATRIZOATE MEGLUMINE AND DIATRIZOATE SODIUM 15 ML: 660; 100 LIQUID ORAL; RECTAL at 14:52

## 2022-11-29 RX ADMIN — IOPAMIDOL 100 ML: 755 INJECTION, SOLUTION INTRAVENOUS at 14:53

## 2022-11-30 DIAGNOSIS — R59.9 ENLARGED LYMPH NODE: Primary | ICD-10-CM

## 2022-12-01 ENCOUNTER — OFFICE VISIT (OUTPATIENT)
Dept: SURGERY | Age: 62
End: 2022-12-01
Payer: COMMERCIAL

## 2022-12-01 ENCOUNTER — PREP FOR PROCEDURE (OUTPATIENT)
Dept: SURGERY | Age: 62
End: 2022-12-01

## 2022-12-01 VITALS
SYSTOLIC BLOOD PRESSURE: 98 MMHG | DIASTOLIC BLOOD PRESSURE: 62 MMHG | WEIGHT: 115 LBS | OXYGEN SATURATION: 98 % | BODY MASS INDEX: 18.05 KG/M2 | HEART RATE: 77 BPM | HEIGHT: 67 IN

## 2022-12-01 DIAGNOSIS — R59.9 LYMPH NODE ENLARGEMENT: Primary | ICD-10-CM

## 2022-12-01 PROCEDURE — 99204 OFFICE O/P NEW MOD 45 MIN: CPT | Performed by: STUDENT IN AN ORGANIZED HEALTH CARE EDUCATION/TRAINING PROGRAM

## 2022-12-01 NOTE — PROGRESS NOTES
General Surgery New Patient Office Note:    12/1/2022    Fuad Miranda  MRN: [id number]      CHIEF COMPLAINT: Right inguinal lymphadenopathy    PRIMARY CARE PHYSICIAN: Stephanie Werner MD, MD    HISTORY: Patient presents with right lymphadenopathy. Patient states that she had COVID in September. She states that since that time she has noticed lymphadenopathy. She does have some weight loss. She denies any night sweats. She underwent a EGD and colonoscopy and was found to have no issues. Underwent CT scan which showed no abnormalities. She does state that it does concern her little bit.     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    Chronic tension-type headache, not intractable 12/31/2018    Colon polyp     Benign    Constipation     Cystic acne     spironolactone    GERD (gastroesophageal reflux disease)     GERD (gastroesophageal reflux disease) 12/31/2018    IBS (irritable bowel syndrome)     Insomnia     Low back pain     Menopause     Muscle spasm     Nausea & vomiting     pt states very sensitive-request zofran; pt requests e-mend or scopolamine patch    OA (osteoarthritis)     Other acne 12/31/2018    Ovarian cyst     Polyarthritis 12/31/2018    Associated with enteritis per pt report diagnosed in [facility] by her PCP there    Postmenopausal     Rectocele     Ruptured appendix     Ruptured disk 2005    SOB (shortness of breath)     Unspecified adverse effect of anesthesia     hard to wake up    Viral meningitis 2008       Current Outpatient Medications   Medication Sig Dispense Refill    montelukast (SINGULAIR) 10 MG tablet Take 10 mg by mouth nightly      pantoprazole (PROTONIX) 20 MG tablet Take 20 mg by mouth daily      Simethicone (GAS-X PO) Take by mouth      magnesium oxide (MAG-OX) 400 MG tablet Take 400 mg by mouth daily butalbital-acetaminophen-caffeine (FIORICET, ESGIC) -40 MG per tablet Take 1 tablet by mouth every 4 hours as needed for Headaches Take one tablet at onset of migraine as needed. 30 tablet 11    estradiol (VIVELLE) 0.05 MG/24HR Place 1 patch onto the skin Twice a Week 8 patch 11    linaclotide (LINZESS) 145 MCG capsule Take 145 mcg by mouth every morning (before breakfast)      zolpidem (AMBIEN CR) 12.5 MG extended release tablet Take 12.5 mg by mouth.      estradiol (VIVELLE) 0.1 MG/24HR Place 1 patch onto the skin       No current facility-administered medications for this visit. Facility-Administered Medications Ordered in Other Visits   Medication Dose Route Frequency Provider Last Rate Last Admin    sodium chloride flush 0.9 % injection 10 mL  10 mL IntraVENous ONCE PRN Da Miguel MD        diatrizoate meglumine-sodium (GASTROGRAFIN) 66-10 % solution 15 mL  15 mL Oral ONCE PRN Da Miguel MD   15 mL at 11/29/22 1452    0.9 % sodium chloride bolus  100 mL IntraVENous ONCE PRN Da Miguel MD           Family History   Problem Relation Age of Onset    Diabetes Father     Heart Attack Maternal Grandfather     Breast Cancer Paternal Grandmother 76    Diabetes Maternal Grandmother     Breast Cancer Maternal Grandmother         Late in life. Uterine Cancer Maternal Grandmother     Heart Attack Paternal Grandfather     Macular Degen Mother     Liver Disease Sister     Allergic Rhinitis Sister     Breast Cancer Sister 48    Crohn's Disease Sister     Breast Cancer Maternal Aunt     No Known Problems Brother     Prostate Cancer Father        Social History     Socioeconomic History    Marital status:      Spouse name: None    Number of children: None    Years of education: None    Highest education level: None   Tobacco Use    Smoking status: Never    Smokeless tobacco: Never   Substance and Sexual Activity    Alcohol use:  Yes     Alcohol/week: 1.7 standard drinks Drug use: No         PHYSICAL EXAMINATION:    BP 98/62   Pulse 77   Ht 5' 7\" (1.702 m)   Wt 115 lb (52.2 kg)   SpO2 98%   BMI 18.01 kg/m²     General Appearance AOx3, in no acute distress  Eyes Conjunctivae/corneas clear. PERRL, EOM's intact. No scleral icterus  Ears, Nose, Throat ENT exam normal, no neck nodes or sinus tenderness  Neck supple, no significant adenopathy. No notable JVD  Respiratory No chest wall deformities or tenderness, respiratory effort normal, no use of accessory muscles. Cardiovascular RRR. No chest wall tenderness. Gastrointestinal Abdomen soft, nontender, nondistended. BS x4. No rebound, guarding, or rigidity present. No palpable masses. No CVA tenderness  Lymphatics No palpable lymphadenopathy, no hepatosplenomegaly  Musculoskeletal No joint tenderness, deformity or swelling. Full ROM UE/LE. Distal pulses intact UE/LE. No edema, cyanosis, or venous stasis changes. Skin Normal coloration and turgor, no rashes, no suspicious skin lesions noted, small right inguinal lymph node prominent  Neurological alert, oriented, normal speech, no focal findings or movement disorder noted, CN II-XII intact  Psychiatric Alert and oriented, appropriate affect      STUDIES: CT Result (most recent):  CT ABDOMEN PELVIS W IV CONTRAST 11/29/2022    Narrative  EXAMINATION: CT ABDOMEN PELVIS W IV CONTRAST 11/29/2022 2:59 PM    ACCESSION NUMBER: [id number]    COMPARISON: CT abdomen and pelvis 9/25/2015    INDICATION: Enlarged lymph nodes, unspecified; Abnormal weight loss palpable  right inguinal lymph node    TECHNIQUE: Contiguous axial computed tomographic images were obtained from the  domes of the diaphragm to the symphysis pubis following administration 100mL  Iso-mariana 370 coronal and sagittal reconstructions were also performed. Oral  contrast was also administered. Radiation dose reduction techniques were used for this study.  Our CT scanners  use one or all of the following: Automated exposure incomplete symptom resolution. They understand and agree to proceed.

## 2022-12-02 ENCOUNTER — PREP FOR PROCEDURE (OUTPATIENT)
Dept: SURGERY | Age: 62
End: 2022-12-02

## 2022-12-05 RX ORDER — SODIUM CHLORIDE 9 MG/ML
INJECTION, SOLUTION INTRAVENOUS PRN
Status: CANCELLED | OUTPATIENT
Start: 2022-12-05

## 2022-12-05 RX ORDER — SODIUM CHLORIDE 0.9 % (FLUSH) 0.9 %
5-40 SYRINGE (ML) INJECTION PRN
Status: CANCELLED | OUTPATIENT
Start: 2022-12-05

## 2022-12-05 RX ORDER — SODIUM CHLORIDE 0.9 % (FLUSH) 0.9 %
5-40 SYRINGE (ML) INJECTION EVERY 12 HOURS SCHEDULED
Status: CANCELLED | OUTPATIENT
Start: 2022-12-05

## 2022-12-07 ENCOUNTER — TELEPHONE (OUTPATIENT)
Dept: SURGERY | Age: 62
End: 2022-12-07

## 2022-12-08 ENCOUNTER — CLINICAL DOCUMENTATION (OUTPATIENT)
Dept: SURGERY | Age: 62
End: 2022-12-08

## 2022-12-08 DIAGNOSIS — R59.0 INGUINAL LYMPHADENOPATHY: Primary | ICD-10-CM

## 2022-12-08 NOTE — PROGRESS NOTES
Pt has elected to hold off on surgical excision. She would like to just keep an eye on it at this time. We discussed getting an ultrasound in 3 months to reevaluate. I discussed with her that if it gets larger in size or has any changes to call me and then we will surgically excise it.

## 2022-12-19 DIAGNOSIS — N63.0 BREAST MASS IN FEMALE: Primary | ICD-10-CM

## 2022-12-19 DIAGNOSIS — R92.8 ABNORMALITY OF BREAST ON SCREENING MAMMOGRAPHY: ICD-10-CM

## 2023-02-15 ENCOUNTER — HOSPITAL ENCOUNTER (OUTPATIENT)
Dept: MAMMOGRAPHY | Age: 63
Discharge: HOME | End: 2023-02-18
Payer: COMMERCIAL

## 2023-02-15 DIAGNOSIS — R92.8 ABNORMALITY OF BREAST ON SCREENING MAMMOGRAPHY: ICD-10-CM

## 2023-02-15 DIAGNOSIS — N63.0 BREAST MASS IN FEMALE: ICD-10-CM

## 2023-02-15 PROCEDURE — 76642 ULTRASOUND BREAST LIMITED: CPT

## 2023-02-15 PROCEDURE — G0279 TOMOSYNTHESIS, MAMMO: HCPCS

## 2023-02-15 PROCEDURE — 77066 DX MAMMO INCL CAD BI: CPT

## 2023-02-16 DIAGNOSIS — Z80.3 FAMILY HISTORY OF BREAST CANCER: ICD-10-CM

## 2023-02-16 DIAGNOSIS — N63.0 BREAST MASS IN FEMALE: Primary | ICD-10-CM

## 2023-02-16 DIAGNOSIS — Z98.890 HISTORY OF BREAST BIOPSY: ICD-10-CM

## 2023-02-16 NOTE — PROGRESS NOTES
Orders Placed This Encounter    MRI BREAST BILATERAL W WO CONTRAST     Standing Status:   Future     Standing Expiration Date:   2/16/2024     Order Specific Question:   STAT Creatinine as needed:     Answer:   Yes     Comments:   iff needed      Placed per patient request and recommendation of radiologist

## 2023-03-09 ENCOUNTER — HOSPITAL ENCOUNTER (OUTPATIENT)
Dept: ULTRASOUND IMAGING | Age: 63
Discharge: HOME | End: 2023-03-09
Payer: COMMERCIAL

## 2023-03-09 DIAGNOSIS — R59.0 INGUINAL LYMPHADENOPATHY: ICD-10-CM

## 2023-03-09 PROCEDURE — 76882 US LMTD JT/FCL EVL NVASC XTR: CPT

## 2023-03-13 ENCOUNTER — HOSPITAL ENCOUNTER (OUTPATIENT)
Dept: PHYSICAL THERAPY | Age: 63
Setting detail: RECURRING SERIES
Discharge: HOME | End: 2023-03-16
Payer: COMMERCIAL

## 2023-03-13 PROCEDURE — 97161 PT EVAL LOW COMPLEX 20 MIN: CPT

## 2023-03-13 PROCEDURE — 97110 THERAPEUTIC EXERCISES: CPT

## 2023-03-13 ASSESSMENT — PAIN SCALES - GENERAL: PAINLEVEL_OUTOF10: 0

## 2023-03-13 NOTE — PROGRESS NOTES
Prince Reyes  : 1960  Primary: Aidee Miramontes ([facility])  Secondary:  O MILLENNIUM  [address] Jerzy aguilar [address] [place] [id number]  Phone: [phone]  Fax: [phone] Plan Frequency: 1x/week for 90 days    Plan of Care/Certification Expiration Date: 23      PT Visit Info:  Plan Frequency: 1x/week for 90 days  Plan of Care/Certification Expiration Date: 23  Total # of Visits Approved: 75  Total # of Visits to Date: 1      Visit Count:  1    OUTPATIENT PHYSICAL THERAPY:OP NOTE TYPE: Treatment Note 3/13/2023       Episode  }Appt Desk             Treatment Diagnosis:  Lack of coordination (muscle incoordination) (R27.8)  Pelvic floor dysfunction in female (M62.98)  Outlet dysfunction constipation (K59.02)  Medical/Referring Diagnosis:  PFD (pelvic floor dysfunction) [M62.89]  Chronic idiopathic constipation [K59.04]  Irritable bowel syndrome with constipation [K58.1]  Referring Physician: Marie Lopez PA-C MD Orders:  PT Eval and Treat   Date of Onset:  Onset Date:  (chronic)     Allergies:   Pneumococcal vaccine and Codeine  Restrictions/Precautions:  Restrictions/Precautions: None  No data recorded     Interventions Planned (Treatment may consist of any combination of the following):    Current Treatment Recommendations: Strengthening; ROM; ADL/Self-care training; Neuromuscular re-education; Manual; Home exercise program; Patient/Caregiver education & training; Positioning; Therapeutic activities     Subjective Comments:  chronic constipation  Initial:}    0/10Post Session:       0/10  Medications Last Reviewed:  3/13/2023  Updated Objective Findings:  See evaluation note from today  Treatment   THERAPEUTIC EXERCISE: (10 minutes): Exercises per grid below to improve mobility and coordination. Required moderate verbal and tactile cues to promote proper body breathing techniques and speed of PFM relaxation . Progressed range and repetitions as indicated.    Date:  3/13/23 Date:   Date:     Activity/Exercise Parameters Parameters Parameters   HEP PF drops and diaphragmatic breathing to improve PFM relaxation and coordination                   THERAPEUTIC ACTIVITY: ( 12 minutes): Functional activity education regarding anatomy, pathology and role of pelvic floor muscle (PFM) function in relation to presenting symptoms and role of pelvic floor therapy in conservative treatment. and Functional activity training to improve toilet positioning and pelvic floor muscle relaxation, to increase anorectal angle in order to improve bowel/bladder emptying and minimize straining/paradoxical PFM activation during defecation. TA Educational Topic Notes Date Completed   Pathology/Anatomy/PFM Function  3/13/23   Bladder health education     Urinary urge suppression     The knack     Voiding strategies     Nocturia tips     Bowel/Bladder log     Bowel health education     Constipation care     Diarrhea/Fecal leakage     Colonic massage     Toilet positioning  3/13/23   Defecation dynamics     Sources of fiber     Return to intercourse/Dilator training     Sexual positioning     Lubricants/vaginal moisturizers     Vulvovaginal health/vaginal irritants     Body mechanics     Posture/ergonomics     Diaphragmatic breathing     Resources and technology     Other patient education       Pt gives verbal consent to internal rectal assessment/treatment without chaperon present. Treatment/Session Summary:    Treatment Assessment:  Eric Bashir presents with musculoskeletal limitations including pelvic floor muscle (PFM) tension, altered body mechanics, and reduced coordination and awareness of PFM. These limitations are impairing the patient's ability to properly coordinate perineal elevation and descent for normalized PFM function, contributing to bowel dysfunction.  As a result, she is limited in her/his ability to participate in household chores, physical activities such as walking, swimming, or other exercise, entertainment activities such as going to a movie or concert, traveling by car or bus for a distance greater then 30 minutes away from home, and social activities outside of the home. Communication/Consultation:  None today  Equipment provided today:  None  Recommendations/Intent for next treatment session: Next visit will focus on vaginal assessment- prolapse, PFM coordination- biofeedback, abdominal STM/colonic massage.     Total Treatment Billable Duration:  Evaluation 40 minutes, 22 minutes treatment (10 minutes TE, 12 minutes TA)  Time In: 1400  Time Out: 1509    Gretel Zafarr, PT       Charge Capture  }Post Session Pain  PT Visit Info  [employer] Portal  MD Guidelines  Scanned Media  Benefits  MyChart    Future Appointments   Date Time Provider Frankie Ambrose   3/15/2023  1:45 PM Avani palomino DO [facility]   3/20/2023  4:00 PM [facility]   3/27/2023  1:00 PM Gretel Evangelista, PT [facility]   4/12/2023  1:45 PM MD coy Martin [facility]   4/13/2023  1:00 PM [facility]

## 2023-03-13 NOTE — THERAPY EVALUATION
Dk Annia  : 1960  Primary: Jenifer Miramontes ([facility]  Secondary:  O MILLENNIUM  [address] Alton Juarez [address] [place] [id number]  Phone: [phone]  Fax: [phone] Plan Frequency: 1x/week for 90 days  Plan of Care/Certification Expiration Date: 23    PT Visit Info:  Plan Frequency: 1x/week for 90 days  Plan of Care/Certification Expiration Date: 23  Total # of Visits Approved: 75  Total # of Visits to Date: 1    Visit Count:  1                OUTPATIENT PHYSICAL THERAPY: OP NOTE TYPE: Initial Assessment 3/13/2023               Episode (PFPT) Appt Desk      Treatment Diagnosis:  Lack of coordination (muscle incoordination) (R27.8)  Pelvic floor dysfunction in female (M62.98)  Outlet dysfunction constipation (K59.02)  Medical/Referring Diagnosis:  PFD (pelvic floor dysfunction) [M62.89]  Chronic idiopathic constipation [K59.04]  Irritable bowel syndrome with constipation [K58.1]  Referring Physician: Alesha Sung PA-C MD Orders:  PT Eval and Treat  Return MD Appt:  2023  Date of Onset:  Onset Date:  (chronic)    Allergies:  Pneumococcal vaccine and Codeine  Restrictions/Precautions:    Restrictions/Precautions: None      Medications Last Reviewed:  3/13/2023     SUBJECTIVE   History of Injury/Illness (Reason for Referral):  Ms. Jonel Gaxiola is a 64 yo female referred to pelvic floor physical therapy (PFPT) by Alesha Sung 2/2 PFD (pelvic floor dysfunction) [M62.89]  Chronic idiopathic constipation [K59.04]  Irritable bowel syndrome with constipation [K58.1]. Pt reports a history of chronic constipation and IBS for at least 20 years. She has been on Linzess for many years. When on 145mcg, but increased to 290mcg in November as 145mcg was no longer effective for her. She is having 15+ bowel movements a day. Earlier movements are very loose and liquidy and get a little more formed throughout the day (ranging type 5-7).  Since her increased her dosage she has a hard time leaving the house and states that she is always looking for a bathroom when she is out due to fear of needing to having a bowel movement. This causes her a lot of stress and anxiety. Currently on Linzess 290mg, no enema, fibercon 1x/day, magnesium 500mg 1/xday, acid reflux 2x/day    She does have some generalized abdominal discomfort/pain. States that in 2013 she had a ruptured appendix for 10 days that was misdiagnosed as constipation. Believe there is a lot of scar tissue from this and possibly other pelvic related surgeries. Has noticed that abdominal pain is more frequent. She feels this daily. She also has occasional rectal/coccyx pain but this is less frequent. Dr. Lady Thao performed EGD/colonoscopy 11/16/2022. No concerning findings on biopsies or anything to explain her unintentional weight loss. Proceed with CT scan abd/pelvis as ordered by her other providers for further evaluation of weight loss. She will need repeat colonoscopy in 10 years for surveillance purposes. [facility] due to prolapse. She had a few session of when sounds like an e-stim type chair for PFM strengthening, but did not continue due to travel/time. Urinary: Frequency 8-10 x/day, 1-2 x/night. Positive for  nothing . Negative for stress urinary incontinence, urge urinary incontinence, urinary urgency, urinary frequency, incomplete emptying, urinary hesitancy/intermittency, dysuria, hematuria, nocturia, and nocturnal enuresis. Pt does not use pads for urinary protection; 0 pads per day (PPD). Fluid intake: 2L+ oz water/day; bladder irritants include: 1c coffee. Pt does not limit fluid intake due to bladder control. Bowel: Frequency 15+x/day. Positive for pushing/straining with bowel movement, constipation, and incomplete emptying. Negative for pain with bowel movement and fecal incontinence. Pt does not use pads for bowel protection; 0 pads per day (PPD). [place] stool type: 5, 6, and 7. Use of stool softeners or laxatives? No    Sexual: Pt is  sexually active with male partners. Contraception/birth control: s/p hysterectomy 2019. Negative for dyspareunia. History of sexual abuse: No    Pelvic Organ Prolapse/Pelvic Pain: Location: abdominal (daily), rectal (intermittent). Worse with constipation. Relieved with emptying bowels, heating pad. Max pain 7/10. Min pain 2/10. Menstrual History: s/p hysterectomy    OB History: Number of pregnancies: 0 Number of vaginal births: 0 Number of caesarean births : 0.    Patient Stated Goal(s):  \"better sleep, improved quality of life\"  Initial:     0/10 Post Session:     0/10  Past Medical History/Comorbidities:   Ms. Jonel Gaxiola  has a past medical history of Allergic rhinitis, Arthritis, Chronic tension-type headache, not intractable, Colon polyp, Constipation, Cystic acne, GERD (gastroesophageal reflux disease), GERD (gastroesophageal reflux disease), IBS (irritable bowel syndrome), Insomnia, Low back pain, Menopause, Muscle spasm, Nausea & vomiting, OA (osteoarthritis), Other acne, Ovarian cyst, Polyarthritis, Postmenopausal, Rectocele, Ruptured appendix, Ruptured disk, SOB (shortness of breath), Unspecified adverse effect of anesthesia, and Viral meningitis. Ms. Jonel Gaxiola  has a past surgical history that includes gyn; Breast biopsy (Left, 04/08/2021); Colonoscopy (10/05/2015); polypectomy; gyn; Appendectomy (9/2013); gyn (11/25/2015); orthopedic surgery (Right, 2010); Tonsillectomy; and YOLA STEREO BREAST BX W LOC DEVICE 1ST LESION LEFT (Left, 4/8/2021).   Social History/Living Environment:   Lives With: Spouse  Type of Home: House     Prior Level of Function/Work/Activity:   Prior level of function: Independent  Occupation: Retired  Leisure & Hobbies: exercises 2-3x/week; light [employer], yoga/stretching           Learning:   Does the patient/guardian have any barriers to learning?: No barriers  Will there be a co-learner?: No  What is the preferred language of the patient/guardian?: English  Is an  required?: No  How does the patient/guardian prefer to learn new concepts?: Listening; Reading; Demonstration; Pictures/Videos     Fall Risk Scale: Raymond Total Score: 0  Raymond Fall Risk: Low (0-24)         OBJECTIVE   External Observation:  Tissue Integrity: WNL  Hemorrhoids:  not present  Rugae: Present  Fecal or mucosal discharge: Absent   Gaping: Absent   Rectal Prolapse: Absent   Voluntary contraction:  [] absent     [x] present  Voluntary relaxation:  [] absent     [x] present  Involuntary contraction: [] absent     [x] present  Involuntary relaxation: [] absent     [x] present  Perineal descent at rest: [x] absent     [] present  Perineal descent with bearing: [] absent     [x] present  Postural assessment: WNL  Gait: WNL    Patient positioning for assessment: Sidelying    Sensation testing: Intact    External palpation:  Coccyx  Positioning: Flexed  Tenderness: Present    Internal Rectal Assessment:  Palpation:  Muscle (superficial to deep) Tenderness?    External anal sphincter [] Right     [] Left     [] DNT   Internal anal sphincter [] Right     [] Left     [] DNT   Puborectalis [x] Right     [x] Left     [] DNT   Pubococcygeus [x] Right     [x] Left     [] DNT   Iliococcygeus [] Right     [] Left     [] DNT   Coccygeus [] Right     [] Left     [] DNT   Piriformis [] Right     [] Left     [] DNT   Obturator internus [] Right     [] Left     [] DNT       Sacrospinous ligament [] Right     [] Left     [] DNT     Contraction Ability:  Voluntary contraction: [] absent     [] weak     [x] moderate      [] strong  Voluntary relaxation: [] absent     [] partial   [] complete   [x] delayed    [x] incomplete    MMT: 3/5   EAS endurance (goal 60 seconds): DNT  PFM endurance: DNT  Repetitions of endurance contractions: DNT  Quality of contractions: DNT  Overflow: [x] absent     [] min     [] mod     [] severe    ASSESSMENT   Initial Assessment:  Janessa Kim presents with musculoskeletal limitations including pelvic floor muscle (PFM) tension, altered body mechanics, and reduced coordination and awareness of PFM. These limitations are impairing the patient's ability to properly coordinate perineal elevation and descent for normalized PFM function, contributing to bowel dysfunction. As a result, she is limited in her/his ability to participate in household chores, physical activities such as walking, swimming, or other exercise, entertainment activities such as going to a movie or concert, traveling by car or bus for a distance greater then 30 minutes away from home, and social activities outside of the home. Problem List: (Impacting functional limitations): Body Structures, Functions, Activity Limitations Requiring Skilled Therapeutic Intervention: Decreased ADL status; Decreased ROM; Decreased body mechanics; Decreased endurance; Decreased coordination; Decreased posture; Increased pain     Therapy Prognosis:   Therapy Prognosis: Good     Initial Assessment Complexity:   Decision Making: Low Complexity    PLAN   Effective Dates: 3/13/23 TO Plan of Care/Certification Expiration Date: 06/11/23   Frequency/Duration: Plan Frequency: 1x/week for 90 days   Interventions Planned (Treatment may consist of any combination of the following):    Current Treatment Recommendations: Strengthening; ROM; ADL/Self-care training; Neuromuscular re-education; Manual; Home exercise program; Patient/Caregiver education & training; Positioning; Therapeutic activities     Goals: (Goals have been discussed and agreed upon with patient.)  Short-Term Functional Goals: Time Frame: 6 weeks  Pt will demonstrate I with basic PFM HEP to improve awareness, coordination, and timing of PFM.   Patient will demonstrate understanding of and ability to teach back appropriate water and fiber intake, toileting frequency, and positioning for improved self-management of symptoms. Patient will demonstrate understanding of and ability to teach back two strategies to reduce constipation and improve toileting to minimize strain on and/or paradoxical movement of the pelvic floor muscles. Patient will demonstrate ability to perform diaphragmatic breathing in multiple positions in order to improve pelvic floor muscle (PFM) lengthening and reduced discomfort and/or straining to aid in bowel evacuation. Patient will demonstrate ability to perform diaphragmatic breathing in multiple positions to assist in pelvic floor tension reduction. Discharge Goals: Time Frame: 12 weeks  Patient will demonstrate ability to increase intra-abdominal pressure and eccentrically contract the pelvic floor muscles without breath holding, as assessed by digitally or with use of sEMG biofeedback, in order to improve bowel evacuation. Patient will report minimal to no pain upon examination of the levator ani muscles and report minimal or no discomfort with bowel evacuation. Patient will improve outcome score by 20%. Patient will demonstrate independence with a home exercise program for aerobic conditioning, core stabilization, and general mobility for independent management of symptoms. Outcome Measure:   Pelvic Floor Impact Questionnaire--short form 7 (PFIQ-7):  Score:  Initial: 3/13/23  Bladder or Urine: 0/100  Bowel or Rectum: 80.95/100  Vagina or Pelvis: 0/100 Most Recent: X (Date: -- )  Bladder or Urine: X/100  Bowel or Rectum: X/100  Vagina or Pelvis: X/100   Interpretation of Score: Each of the 7 sections is scored on a scale from 0-3; 0 representing \"Not at all\", 3 representing \"Quite a bit\". The mean value is taken from all the answered items, then multiplied by 100 to obtain the scale score, ranging from 0-100. This process is repeated for each column representing bowel, bladder, and pelvic pain.     Medical Necessity:   > Patient is expected to demonstrate progress in strength, range of motion, coordination, and functional technique to improve bowel evacuation. Reason For Services/Other Comments:  > Patient continues to require skilled intervention due to above mentioned deficits. Total Duration:  Time In: 1400  Time Out: 1509    Regarding Alicia Landry's therapy, I certify that the treatment plan above will be carried out by a therapist or under their direction. Thank you for this referral,  Vito Quach, PT     Referring Physician Signature: Radha Mccollum No Signature is Required for this note.         Post Session Pain  Charge Capture  PT Visit Info MD Guidelines  [place]

## 2023-03-15 ENCOUNTER — OFFICE VISIT (OUTPATIENT)
Dept: SURGERY | Age: 63
End: 2023-03-15
Payer: COMMERCIAL

## 2023-03-15 VITALS — WEIGHT: 115 LBS | BODY MASS INDEX: 18.05 KG/M2 | HEIGHT: 67 IN

## 2023-03-15 DIAGNOSIS — R59.9 LYMPH NODE ENLARGEMENT: Primary | ICD-10-CM

## 2023-03-15 PROCEDURE — 99214 OFFICE O/P EST MOD 30 MIN: CPT | Performed by: STUDENT IN AN ORGANIZED HEALTH CARE EDUCATION/TRAINING PROGRAM

## 2023-03-15 NOTE — PROGRESS NOTES
General Surgery New Patient Office Note:    3/15/2023    Lexis Egan  MRN: [id number]      CHIEF COMPLAINT: Right inguinal mass    PRIMARY CARE PHYSICIAN: Natanael Yee MD    HISTORY: Patient presents for follow-up of right inguinal mass. She states that she does not think that it is changed much in size. She went to her ultrasound and they did not see any lymphadenopathy. She states that its not causing her any discomfort. She does have a family history of lymphoma.     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    Chronic tension-type headache, not intractable 12/31/2018    Colon polyp     Benign    Constipation     Cystic acne     spironolactone    GERD (gastroesophageal reflux disease)     GERD (gastroesophageal reflux disease) 12/31/2018    IBS (irritable bowel syndrome)     Insomnia     Low back pain     Menopause     Muscle spasm     Nausea & vomiting     pt states very sensitive-request zofran; pt requests e-mend or scopolamine patch    OA (osteoarthritis)     Other acne 12/31/2018    Ovarian cyst     Polyarthritis 12/31/2018    Associated with enteritis per pt report diagnosed in [facility] by her PCP there    Postmenopausal     Rectocele     Ruptured appendix     Ruptured disk 2005    SOB (shortness of breath)     Unspecified adverse effect of anesthesia     hard to wake up    Viral meningitis 2008       Current Outpatient Medications   Medication Sig Dispense Refill    montelukast (SINGULAIR) 10 MG tablet Take 10 mg by mouth nightly      pantoprazole (PROTONIX) 20 MG tablet Take 20 mg by mouth daily      Simethicone (GAS-X PO) Take by mouth      magnesium oxide (MAG-OX) 400 MG tablet Take 400 mg by mouth daily      butalbital-acetaminophen-caffeine (FIORICET, ESGIC) -40 MG per tablet Take 1 tablet by mouth every 4 hours as needed for Headaches Take one tablet at onset of migraine as needed. 30 tablet 11    estradiol (VIVELLE) 0.05 MG/24HR Place 1 patch onto the skin Twice a Week 8 patch 11    estradiol (VIVELLE) 0.1 MG/24HR Place 1 patch onto the skin      linaclotide (LINZESS) 145 MCG capsule Take 145 mcg by mouth every morning (before breakfast)      zolpidem (AMBIEN CR) 12.5 MG extended release tablet Take 12.5 mg by mouth. No current facility-administered medications for this visit. Family History   Problem Relation Age of Onset    Diabetes Father     Heart Attack Maternal Grandfather     Breast Cancer Paternal Grandmother 76    Diabetes Maternal Grandmother     Breast Cancer Maternal Grandmother         Late in life. Uterine Cancer Maternal Grandmother     Heart Attack Paternal Grandfather     Macular Degen Mother     Liver Disease Sister     Allergic Rhinitis Sister     Breast Cancer Sister 48    Crohn's Disease Sister     Breast Cancer Maternal Aunt     No Known Problems Brother     Prostate Cancer Father        Social History     Socioeconomic History    Marital status:      Spouse name: None    Number of children: None    Years of education: None    Highest education level: None   Tobacco Use    Smoking status: Never    Smokeless tobacco: Never   Substance and Sexual Activity    Alcohol use: Yes     Alcohol/week: 1.7 standard drinks    Drug use: No         PHYSICAL EXAMINATION:    Ht 5' 7\" (1.702 m)   Wt 115 lb (52.2 kg)   BMI 18.01 kg/m²     General Appearance AOx3, in no acute distress  Eyes Conjunctivae/corneas clear. PERRL, EOM's intact. No scleral icterus  Ears, Nose, Throat ENT exam normal, no neck nodes or sinus tenderness  Neck supple, no significant adenopathy. No notable JVD  Respiratory No chest wall deformities or tenderness, respiratory effort normal, no use of accessory muscles. Cardiovascular RRR. No chest wall tenderness. Gastrointestinal Abdomen soft, nontender, nondistended. BS x4.  No rebound, guarding, or rigidity present. No palpable masses. No CVA tenderness. Right inguinal region with 2 areas of palpable lymph nodes  Lymphatics No palpable lymphadenopathy, no hepatosplenomegaly  Musculoskeletal No joint tenderness, deformity or swelling. Full ROM UE/LE. Distal pulses intact UE/LE. No edema, cyanosis, or venous stasis changes. Skin Normal coloration and turgor, no rashes, no suspicious skin lesions noted  Neurological alert, oriented, normal speech, no focal findings or movement disorder noted, CN II-XII intact  Psychiatric Alert and oriented, appropriate affect      STUDIES: US Result (most recent):  US EXTREMITY RIGHT NON VASCULAR LIMITED 03/09/2023    Narrative  History: Follow-up right inguinal lymphadenopathy    EXAM: Limited right lower extremity ultrasound    FINDINGS: No pathologically enlarged lymph nodes seen within the inguinal  region. Impression  Unremarkable study. IMPRESSION:  Right inguinal lymphadenopathy    PLAN:  I do believe that these 2 areas are lymph nodes. I discussed with her that they are not enlarging in size and they did not see any pathologically enlarged lymph nodes on her ultrasound therefore I do feel that we are reasonable to continue to keep a conservative eye on things. The more aggressive approach would be to go in there and surgically excise these areas. At this time she would like to continue with conservative management. I am going to repeat an ultrasound in 6 months just to make sure that nothing is changing. I discussed with her that if she feels like anything is changing to contact me sooner.

## 2023-03-22 ENCOUNTER — HOSPITAL ENCOUNTER (OUTPATIENT)
Dept: PHYSICAL THERAPY | Age: 63
Setting detail: RECURRING SERIES
Discharge: HOME | End: 2023-03-25
Payer: COMMERCIAL

## 2023-03-22 PROCEDURE — 97530 THERAPEUTIC ACTIVITIES: CPT

## 2023-03-22 PROCEDURE — 97110 THERAPEUTIC EXERCISES: CPT

## 2023-03-22 PROCEDURE — 97140 MANUAL THERAPY 1/> REGIONS: CPT

## 2023-03-22 ASSESSMENT — PAIN SCALES - GENERAL: PAINLEVEL_OUTOF10: 0

## 2023-03-22 NOTE — PROGRESS NOTES
upobgyn [facility]   4/13/2023  1:00 PM Wilda Barnes, PT [facility]   8/29/2023  2:00 PM [facility] MRI UNIT 1 SFERMRI [facility]   9/15/2023  1:30 PM [facility] US LOGIC 7 SFERUS [facility]

## 2023-03-27 ENCOUNTER — HOSPITAL ENCOUNTER (OUTPATIENT)
Dept: PHYSICAL THERAPY | Age: 63
Setting detail: RECURRING SERIES
End: 2023-03-27
Payer: COMMERCIAL

## 2023-03-27 NOTE — PROGRESS NOTES
Nahomi Montez  : 1960  Primary: Jacqueline Harrington Sc  Secondary:  [facility] MILLENNIUM  [address] Rachele Loomis [address] [address]  Phone: [phone]  Fax: [phone]    PT Visit Info: Total # of Visits Approved: 75  Total # of Visits to Date: 2  Canceled Appointment: 1      OUTPATIENT THERAPY: 3/27/2023  Episode  Appt Desk        Nahomi Montez cancelled her appointment for today due to illness. Will plan to follow up next during next appointment.   Thank you,  Ruth Ray, PT    Future Appointments   Date Time Provider Frankie Ambrose   3/27/2023  1:00 PM Ruth Ray, [facility]   2023  1:45 PM Sheridan Mehta MD upobgyn [facility]   2023  1:00 PM Ruth Ray, PT [facility]   2023  2:00 PM [facility] MRI UNIT 1 SF[facility]MRI [facility]   9/15/2023  1:30 PM [facility] US LOGIC 7 SFERUS [facility]

## 2023-04-12 PROBLEM — Z80.7 FAMILY HISTORY OF LYMPHOMA: Status: ACTIVE | Noted: 2023-04-12

## 2023-04-13 ENCOUNTER — HOSPITAL ENCOUNTER (OUTPATIENT)
Dept: PHYSICAL THERAPY | Age: 63
Setting detail: RECURRING SERIES
Discharge: HOME | End: 2023-04-16
Payer: COMMERCIAL

## 2023-04-13 PROCEDURE — 97110 THERAPEUTIC EXERCISES: CPT

## 2023-04-13 ASSESSMENT — PAIN SCALES - GENERAL: PAINLEVEL_OUTOF10: 0

## 2023-04-19 ENCOUNTER — OFFICE VISIT (OUTPATIENT)
Dept: SURGERY | Age: 63
End: 2023-04-19
Payer: COMMERCIAL

## 2023-04-19 ENCOUNTER — PREP FOR PROCEDURE (OUTPATIENT)
Dept: SURGERY | Age: 63
End: 2023-04-19

## 2023-04-19 VITALS — BODY MASS INDEX: 18.83 KG/M2 | HEIGHT: 67 IN | WEIGHT: 120 LBS

## 2023-04-19 DIAGNOSIS — R59.9 LYMPH NODE ENLARGEMENT: Primary | ICD-10-CM

## 2023-04-19 PROCEDURE — 99214 OFFICE O/P EST MOD 30 MIN: CPT | Performed by: STUDENT IN AN ORGANIZED HEALTH CARE EDUCATION/TRAINING PROGRAM

## 2023-04-19 RX ORDER — AZITHROMYCIN 500 MG/1
500 TABLET, FILM COATED ORAL DAILY
Qty: 3 TABLET | Refills: 0 | Status: SHIPPED | OUTPATIENT
Start: 2023-04-19 | End: 2023-04-22

## 2023-04-19 NOTE — PROGRESS NOTES
Headaches Take one tablet at onset of migraine as needed. 30 tablet 11    estradiol (VIVELLE) 0.05 MG/24HR Place 1 patch onto the skin Twice a Week 8 patch 11    linaclotide (LINZESS) 145 MCG capsule Take 2 capsules by mouth every morning (before breakfast)      zolpidem (AMBIEN CR) 12.5 MG extended release tablet Take 1 tablet by mouth. No current facility-administered medications for this visit. Family History   Problem Relation Age of Onset    Macular Degen Mother     Diabetes Father     Prostate Cancer Father     Liver Disease Sister     Allergic Rhinitis Sister     Breast Cancer Sister 48    Crohn's Disease Sister     No Known Problems Brother     Diabetes Maternal Grandmother     Breast Cancer Maternal Grandmother         Late in life. Uterine Cancer Maternal Grandmother     Heart Attack Maternal Grandfather     Lymphoma Maternal Grandfather     Breast Cancer Paternal Grandmother 76    Heart Attack Paternal Grandfather     Breast Cancer Maternal Aunt        Social History     Socioeconomic History    Marital status:      Spouse name: None    Number of children: None    Years of education: None    Highest education level: None   Tobacco Use    Smoking status: Never    Smokeless tobacco: Never   Vaping Use    Vaping Use: Never used   Substance and Sexual Activity    Alcohol use: Yes     Alcohol/week: 1.7 standard drinks     Types: 2 Standard drinks or equivalent per week     Comment: occ    Drug use: No    Sexual activity: Yes     Partners: Male     Birth control/protection: Surgical     Comment: Vasectomy         PHYSICAL EXAMINATION:    Ht 5' 7\" (1.702 m)   Wt 120 lb (54.4 kg)   BMI 18.79 kg/m²     General Appearance AOx3, in no acute distress  Eyes Conjunctivae/corneas clear. PERRL, EOM's intact. No scleral icterus  Ears, Nose, Throat ENT exam normal, no neck nodes or sinus tenderness  Neck supple, no significant adenopathy.  No notable JVD  Respiratory No chest wall deformities or

## 2023-05-02 ENCOUNTER — HOSPITAL ENCOUNTER (OUTPATIENT)
Dept: MAMMOGRAPHY | Age: 63
Discharge: HOME | End: 2023-05-05
Payer: COMMERCIAL

## 2023-05-02 DIAGNOSIS — Z13.820 SCREENING FOR OSTEOPOROSIS: ICD-10-CM

## 2023-05-02 PROCEDURE — 77080 DXA BONE DENSITY AXIAL: CPT

## 2023-05-03 ENCOUNTER — TELEPHONE (OUTPATIENT)
Dept: OBGYN CLINIC | Age: 63
End: 2023-05-03

## 2023-05-03 DIAGNOSIS — M81.0 OSTEOPOROSIS, UNSPECIFIED OSTEOPOROSIS TYPE, UNSPECIFIED PATHOLOGICAL FRACTURE PRESENCE: Primary | ICD-10-CM

## 2023-05-03 RX ORDER — ESTRADIOL 0.05 MG/D
1 FILM, EXTENDED RELEASE TRANSDERMAL
Qty: 24 PATCH | Refills: 3 | Status: SHIPPED | OUTPATIENT
Start: 2023-05-04

## 2023-05-03 NOTE — TELEPHONE ENCOUNTER
----- Message from Jose Sparks MD sent at 5/3/2023  1:18 PM EDT -----  Osteoporosis in hip  t score < - 2.5and osteopenia in all other areas- please refer to osteoporosis specialist  to discuss best therapies  ----- Message -----  From: Soumya Mcrae Incoming Orders Results To [facility]iant  Sent: 5/2/2023   1:44 PM EDT  To: Jose Sparks MD

## 2023-05-03 NOTE — TELEPHONE ENCOUNTER
Called patient and reviewed bone density scan results and that Dr Juliet Rai would like her to see a specialist for osteoporosis. Pt notified that referral will be placed and she should be getting a call in about 1-2 weeks. If no follow up from rheumatology is noted in 2 weeks pt instructed to call our office and speak with the referral coordinator. Pt verbalized understanding, no further questions.

## 2023-05-04 ENCOUNTER — PREP FOR PROCEDURE (OUTPATIENT)
Dept: SURGERY | Age: 63
End: 2023-05-04

## 2023-05-05 RX ORDER — SODIUM CHLORIDE 0.9 % (FLUSH) 0.9 %
5-40 SYRINGE (ML) INJECTION EVERY 12 HOURS SCHEDULED
Status: CANCELLED | OUTPATIENT
Start: 2023-05-05

## 2023-05-05 RX ORDER — SODIUM CHLORIDE 9 MG/ML
INJECTION, SOLUTION INTRAVENOUS PRN
Status: CANCELLED | OUTPATIENT
Start: 2023-05-05

## 2023-05-05 RX ORDER — SODIUM CHLORIDE 0.9 % (FLUSH) 0.9 %
5-40 SYRINGE (ML) INJECTION PRN
Status: CANCELLED | OUTPATIENT
Start: 2023-05-05

## 2023-06-04 ENCOUNTER — ANESTHESIA EVENT (OUTPATIENT)
Dept: SURGERY | Age: 63
End: 2023-06-04
Payer: COMMERCIAL

## 2023-06-04 RX ORDER — HYDROMORPHONE HYDROCHLORIDE 2 MG/ML
0.5 INJECTION, SOLUTION INTRAMUSCULAR; INTRAVENOUS; SUBCUTANEOUS EVERY 5 MIN PRN
Status: CANCELLED | OUTPATIENT
Start: 2023-06-04

## 2023-06-04 RX ORDER — ONDANSETRON 2 MG/ML
4 INJECTION INTRAMUSCULAR; INTRAVENOUS
Status: CANCELLED | OUTPATIENT
Start: 2023-06-04 | End: 2023-06-05

## 2023-06-04 RX ORDER — PROCHLORPERAZINE EDISYLATE 5 MG/ML
5 INJECTION INTRAMUSCULAR; INTRAVENOUS
Status: CANCELLED | OUTPATIENT
Start: 2023-06-04 | End: 2023-06-05

## 2023-06-04 RX ORDER — DIPHENHYDRAMINE HYDROCHLORIDE 50 MG/ML
12.5 INJECTION INTRAMUSCULAR; INTRAVENOUS
Status: CANCELLED | OUTPATIENT
Start: 2023-06-04 | End: 2023-06-05

## 2023-06-04 RX ORDER — HYDROMORPHONE HYDROCHLORIDE 2 MG/ML
0.25 INJECTION, SOLUTION INTRAMUSCULAR; INTRAVENOUS; SUBCUTANEOUS EVERY 5 MIN PRN
Status: CANCELLED | OUTPATIENT
Start: 2023-06-04

## 2023-06-04 RX ORDER — OXYCODONE HYDROCHLORIDE 5 MG/1
10 TABLET ORAL PRN
Status: CANCELLED | OUTPATIENT
Start: 2023-06-04 | End: 2023-06-04

## 2023-06-04 RX ORDER — OXYCODONE HYDROCHLORIDE 5 MG/1
5 TABLET ORAL PRN
Status: CANCELLED | OUTPATIENT
Start: 2023-06-04 | End: 2023-06-04

## 2023-06-05 ENCOUNTER — HOSPITAL ENCOUNTER (OUTPATIENT)
Age: 63
Setting detail: OUTPATIENT SURGERY
Discharge: HOME | End: 2023-06-05
Attending: STUDENT IN AN ORGANIZED HEALTH CARE EDUCATION/TRAINING PROGRAM | Admitting: STUDENT IN AN ORGANIZED HEALTH CARE EDUCATION/TRAINING PROGRAM
Payer: COMMERCIAL

## 2023-06-05 ENCOUNTER — ANESTHESIA (OUTPATIENT)
Dept: SURGERY | Age: 63
End: 2023-06-05
Payer: COMMERCIAL

## 2023-06-05 VITALS
TEMPERATURE: 98.1 F | HEART RATE: 61 BPM | OXYGEN SATURATION: 100 % | SYSTOLIC BLOOD PRESSURE: 104 MMHG | BODY MASS INDEX: 18.48 KG/M2 | DIASTOLIC BLOOD PRESSURE: 67 MMHG | HEIGHT: 66 IN | RESPIRATION RATE: 15 BRPM | WEIGHT: 115 LBS

## 2023-06-05 DIAGNOSIS — R59.9 LYMPH NODE ENLARGEMENT: Primary | ICD-10-CM

## 2023-06-05 PROCEDURE — 3700000001 HC ADD 15 MINUTES (ANESTHESIA): Performed by: STUDENT IN AN ORGANIZED HEALTH CARE EDUCATION/TRAINING PROGRAM

## 2023-06-05 PROCEDURE — 88305 TISSUE EXAM BY PATHOLOGIST: CPT

## 2023-06-05 PROCEDURE — 3600000012 HC SURGERY LEVEL 2 ADDTL 15MIN: Performed by: STUDENT IN AN ORGANIZED HEALTH CARE EDUCATION/TRAINING PROGRAM

## 2023-06-05 PROCEDURE — 6360000002 HC RX W HCPCS: Performed by: STUDENT IN AN ORGANIZED HEALTH CARE EDUCATION/TRAINING PROGRAM

## 2023-06-05 PROCEDURE — 7100000000 HC PACU RECOVERY - FIRST 15 MIN: Performed by: STUDENT IN AN ORGANIZED HEALTH CARE EDUCATION/TRAINING PROGRAM

## 2023-06-05 PROCEDURE — 38500 BIOPSY/REMOVAL LYMPH NODES: CPT | Performed by: STUDENT IN AN ORGANIZED HEALTH CARE EDUCATION/TRAINING PROGRAM

## 2023-06-05 PROCEDURE — 3700000000 HC ANESTHESIA ATTENDED CARE: Performed by: STUDENT IN AN ORGANIZED HEALTH CARE EDUCATION/TRAINING PROGRAM

## 2023-06-05 PROCEDURE — 2500000003 HC RX 250 WO HCPCS: Performed by: STUDENT IN AN ORGANIZED HEALTH CARE EDUCATION/TRAINING PROGRAM

## 2023-06-05 PROCEDURE — 7100000010 HC PHASE II RECOVERY - FIRST 15 MIN: Performed by: STUDENT IN AN ORGANIZED HEALTH CARE EDUCATION/TRAINING PROGRAM

## 2023-06-05 PROCEDURE — 2500000003 HC RX 250 WO HCPCS: Performed by: NURSE ANESTHETIST, CERTIFIED REGISTERED

## 2023-06-05 PROCEDURE — 7100000001 HC PACU RECOVERY - ADDTL 15 MIN: Performed by: STUDENT IN AN ORGANIZED HEALTH CARE EDUCATION/TRAINING PROGRAM

## 2023-06-05 PROCEDURE — 2709999900 HC NON-CHARGEABLE SUPPLY: Performed by: STUDENT IN AN ORGANIZED HEALTH CARE EDUCATION/TRAINING PROGRAM

## 2023-06-05 PROCEDURE — 6370000000 HC RX 637 (ALT 250 FOR IP): Performed by: ANESTHESIOLOGY

## 2023-06-05 PROCEDURE — 2580000003 HC RX 258: Performed by: ANESTHESIOLOGY

## 2023-06-05 PROCEDURE — 3600000002 HC SURGERY LEVEL 2 BASE: Performed by: STUDENT IN AN ORGANIZED HEALTH CARE EDUCATION/TRAINING PROGRAM

## 2023-06-05 PROCEDURE — 6360000002 HC RX W HCPCS: Performed by: NURSE ANESTHETIST, CERTIFIED REGISTERED

## 2023-06-05 RX ORDER — LIDOCAINE HYDROCHLORIDE 20 MG/ML
INJECTION, SOLUTION EPIDURAL; INFILTRATION; INTRACAUDAL; PERINEURAL PRN
Status: DISCONTINUED | OUTPATIENT
Start: 2023-06-05 | End: 2023-06-05 | Stop reason: SDUPTHER

## 2023-06-05 RX ORDER — TRAMADOL HYDROCHLORIDE 50 MG/1
50 TABLET ORAL EVERY 4 HOURS PRN
Qty: 15 TABLET | Refills: 0 | Status: SHIPPED | OUTPATIENT
Start: 2023-06-05 | End: 2023-06-08

## 2023-06-05 RX ORDER — MIDAZOLAM HYDROCHLORIDE 2 MG/2ML
2 INJECTION, SOLUTION INTRAMUSCULAR; INTRAVENOUS
Status: DISCONTINUED | OUTPATIENT
Start: 2023-06-05 | End: 2023-06-05 | Stop reason: HOSPADM

## 2023-06-05 RX ORDER — DOCUSATE SODIUM 100 MG/1
100 CAPSULE, LIQUID FILLED ORAL 2 TIMES DAILY
Qty: 60 CAPSULE | Refills: 0 | Status: SHIPPED | OUTPATIENT
Start: 2023-06-05 | End: 2023-07-05

## 2023-06-05 RX ORDER — ONDANSETRON 4 MG/1
4 TABLET, FILM COATED ORAL 3 TIMES DAILY PRN
Qty: 15 TABLET | Refills: 0 | Status: SHIPPED | OUTPATIENT
Start: 2023-06-05

## 2023-06-05 RX ORDER — ACETAMINOPHEN 500 MG
1000 TABLET ORAL ONCE
Status: COMPLETED | OUTPATIENT
Start: 2023-06-05 | End: 2023-06-05

## 2023-06-05 RX ORDER — LIDOCAINE HYDROCHLORIDE 10 MG/ML
1 INJECTION, SOLUTION INFILTRATION; PERINEURAL
Status: DISCONTINUED | OUTPATIENT
Start: 2023-06-05 | End: 2023-06-05 | Stop reason: HOSPADM

## 2023-06-05 RX ORDER — DEXAMETHASONE SODIUM PHOSPHATE 4 MG/ML
INJECTION, SOLUTION INTRA-ARTICULAR; INTRALESIONAL; INTRAMUSCULAR; INTRAVENOUS; SOFT TISSUE PRN
Status: DISCONTINUED | OUTPATIENT
Start: 2023-06-05 | End: 2023-06-05 | Stop reason: SDUPTHER

## 2023-06-05 RX ORDER — SODIUM CHLORIDE, SODIUM LACTATE, POTASSIUM CHLORIDE, CALCIUM CHLORIDE 600; 310; 30; 20 MG/100ML; MG/100ML; MG/100ML; MG/100ML
INJECTION, SOLUTION INTRAVENOUS CONTINUOUS
Status: DISCONTINUED | OUTPATIENT
Start: 2023-06-05 | End: 2023-06-05 | Stop reason: HOSPADM

## 2023-06-05 RX ORDER — SODIUM CHLORIDE 0.9 % (FLUSH) 0.9 %
5-40 SYRINGE (ML) INJECTION EVERY 12 HOURS SCHEDULED
Status: DISCONTINUED | OUTPATIENT
Start: 2023-06-05 | End: 2023-06-05 | Stop reason: HOSPADM

## 2023-06-05 RX ORDER — MIDAZOLAM HYDROCHLORIDE 1 MG/ML
INJECTION INTRAMUSCULAR; INTRAVENOUS PRN
Status: DISCONTINUED | OUTPATIENT
Start: 2023-06-05 | End: 2023-06-05 | Stop reason: SDUPTHER

## 2023-06-05 RX ORDER — BUPIVACAINE HYDROCHLORIDE AND EPINEPHRINE 5; 5 MG/ML; UG/ML
INJECTION, SOLUTION EPIDURAL; INTRACAUDAL; PERINEURAL PRN
Status: DISCONTINUED | OUTPATIENT
Start: 2023-06-05 | End: 2023-06-05 | Stop reason: ALTCHOICE

## 2023-06-05 RX ORDER — SODIUM CHLORIDE 9 MG/ML
INJECTION, SOLUTION INTRAVENOUS PRN
Status: DISCONTINUED | OUTPATIENT
Start: 2023-06-05 | End: 2023-06-05 | Stop reason: HOSPADM

## 2023-06-05 RX ORDER — SODIUM CHLORIDE 0.9 % (FLUSH) 0.9 %
5-40 SYRINGE (ML) INJECTION PRN
Status: DISCONTINUED | OUTPATIENT
Start: 2023-06-05 | End: 2023-06-05 | Stop reason: HOSPADM

## 2023-06-05 RX ORDER — PROPOFOL 10 MG/ML
INJECTION, EMULSION INTRAVENOUS CONTINUOUS PRN
Status: DISCONTINUED | OUTPATIENT
Start: 2023-06-05 | End: 2023-06-05 | Stop reason: SDUPTHER

## 2023-06-05 RX ORDER — ONDANSETRON 2 MG/ML
INJECTION INTRAMUSCULAR; INTRAVENOUS PRN
Status: DISCONTINUED | OUTPATIENT
Start: 2023-06-05 | End: 2023-06-05 | Stop reason: SDUPTHER

## 2023-06-05 RX ADMIN — ACETAMINOPHEN 1000 MG: 500 TABLET, FILM COATED ORAL at 07:22

## 2023-06-05 RX ADMIN — ONDANSETRON 4 MG: 2 INJECTION INTRAMUSCULAR; INTRAVENOUS at 08:38

## 2023-06-05 RX ADMIN — SODIUM CHLORIDE, POTASSIUM CHLORIDE, SODIUM LACTATE AND CALCIUM CHLORIDE: 600; 310; 30; 20 INJECTION, SOLUTION INTRAVENOUS at 07:23

## 2023-06-05 RX ADMIN — MIDAZOLAM 1 MG: 1 INJECTION INTRAMUSCULAR; INTRAVENOUS at 08:30

## 2023-06-05 RX ADMIN — LIDOCAINE HYDROCHLORIDE 40 MG: 20 INJECTION, SOLUTION EPIDURAL; INFILTRATION; INTRACAUDAL; PERINEURAL at 08:36

## 2023-06-05 RX ADMIN — DEXAMETHASONE SODIUM PHOSPHATE 4 MG: 4 INJECTION, SOLUTION INTRAMUSCULAR; INTRAVENOUS at 08:38

## 2023-06-05 RX ADMIN — PROPOFOL 100 MCG/KG/MIN: 10 INJECTION, EMULSION INTRAVENOUS at 08:36

## 2023-06-05 RX ADMIN — Medication 2000 MG: at 08:40

## 2023-06-05 RX ADMIN — MIDAZOLAM 1 MG: 1 INJECTION INTRAMUSCULAR; INTRAVENOUS at 08:34

## 2023-06-05 ASSESSMENT — PAIN - FUNCTIONAL ASSESSMENT: PAIN_FUNCTIONAL_ASSESSMENT: 0-10

## 2023-06-05 NOTE — ANESTHESIA POSTPROCEDURE EVALUATION
Department of Anesthesiology  Postprocedure Note    Patient: Zulma Henao  MRN: [id number]  YOB: 1960  Date of evaluation: 6/5/2023      Procedure Summary     Date: 06/05/23 Room / Location: [facility] OP OR 07 / [facility]    Anesthesia Start: 2695 Anesthesia Stop: 2984    Procedure: excision right inguinal lymphnode (Right) Diagnosis:       Swelling of lymph nodes      (Swelling of lymph nodes [R59.9])    Surgeons: Dinorah Vaughn DO Responsible Provider: Alvin Vargas MD    Anesthesia Type: TIVA ASA Status: 2          Anesthesia Type: No value filed.     Salo Phase I: Salo Score: 8    Salo Phase II: Salo Score: 10      Anesthesia Post Evaluation    Patient location during evaluation: PACU  Patient participation: complete - patient participated  Level of consciousness: awake and alert  Airway patency: patent  Nausea & Vomiting: no nausea and no vomiting  Complications: no  Cardiovascular status: hemodynamically stable  Respiratory status: acceptable, nonlabored ventilation and spontaneous ventilation  Hydration status: euvolemic  Comments: /67   Pulse 61   Temp 98.1 °F (36.7 °C) (Skin)   Resp 15   Ht 5' 6\" (1.676 m)   Wt 115 lb (52.2 kg)   SpO2 100%   BMI 18.56 kg/m²     Multimodal analgesia pain management approach

## 2023-06-05 NOTE — DISCHARGE INSTRUCTIONS
DISCHARGE INSTRUCTIONS    Please call our office for a follow-up appointment in 1-2 week(s). Driving: No driving while taking pain medications    Activity: No strenous activity. Slowly advance as tolerated. No lifting greater than 20lbs. Diet:  Slowly advance as tolerated. Increase your fluids and fiber, as pain medications may cause constipation. No alcoholic beverages. Bathing: You may shower. No tub baths for 5 days. Dressing: If outer dressing, remove in 24 hours. Leave steri strips intact. Other:  Ice to incision as needed for pain. If drains present, empty and record    output daily. Call Dr. Oliver Hinson if you have:  ? Temperature greater than 100.4  ? Persistent nausea and vomiting  ? Severe uncontrolled pain  ? Redness, tenderness, or signs of infection (pain, swelling, redness, odor or green/yellow discharge around the site)  ? Difficulty breathing, headache or visual disturbances  ? Hives  ? Persistent dizziness or light-headedness  ? Extreme fatigue  ? Any other questions or concerns you may have after discharge    In an emergency, call 911 or go to an Emergency Department at [facility] or [facility].    It is important to bring a complete, current list of your medications to any medical appointments or hospitalizations. ACTIVITY  As tolerated and as directed by your doctor. Bathe or shower as directed by your doctor. DIET  Clear liquids until no nausea or vomiting; then light diet for the first day. Advance to regular diet on second day, unless your doctor orders otherwise. If nausea and vomiting continues, call your doctor. PAIN  Take pain medication as directed by your doctor. Call your doctor if pain is NOT relieved by medication. DO NOT take aspirin of blood thinners unless directed by your doctor.      MEDICATION INTERACTION:During your procedure you potentially received a medication or medications which may reduce the effectiveness of oral

## 2023-06-05 NOTE — PERIOP NOTE
PACU DISCHARGE NOTE    Pt and family educated on discharge instructions. No additional questions at this time. Vital signs stable, pain well controlled, alert and oriented times three or at baseline, follow up per surgeon, no anesthetic complications.

## 2023-06-05 NOTE — OP NOTE
Excision of right inguinal lymph nodes Procedure Note      Name:  Naresh Hampton Date/Time of Admission: 2023  6:54 AM  CSN: [id number]  Attending Provider: Davi Cordon *  MRN:  [id number]  : 1960 65 y.o. Pre-operative Diagnosis: right inguinal lymphadenopathy    Post-operative Diagnosis: Same    Procedure:  Excision of right inguinal lymph nodes    Surgeon: Davi Codron, *    Anesthesia: MAC anesthesia    Specimen: mass     INDICATION: The patient is a 71-year-old female who complains of right inguinal lymphadenopathy. They would like it surgically removed. DESCRIPTION OF PROCEDURE: The patient was correctly identified in preoperative holding area. Consent was confirmed and placed on the chart. Preoperative antibiotics were administered per SCIP protocol. The patient was then taken back to the operative suite and placed in the supine position. MAC anesthesia was performed per anesthesia. The patient's was then prepped and draped in the usual sterile fashion. A timeout was performed and all members of the team that were present were in agreement. The procedure began by making an incision over the apex of the mass. Scalpel was used to sharply dissect through the subcuticular tissue to the lymph nodes. She had 3 enlarged superficial lymph nodes. It was removed in its entirety. Further inspection of the cavity noted no other acute abnormalities. Hemostasis was achieved with electric cautery. The wound was irrigated with normal saline. 3-0 Monocryl suture was used to close the skin in a simple interrupted fashion. Steri strips was placed on the incision. At this time, the procedure was complete. At the conclusion of the case all sponge, needles and instrument counts were correct. The patient tolerated well and was taken to the [address] [place] [address] Unit.       Jennifer Santana [facility] Martha Chen DO

## 2023-06-05 NOTE — ANESTHESIA PRE PROCEDURE
Department of Anesthesiology  Preprocedure Note       Name:  Solomon Saleh   Age:  65 y.o.  :  1960                                          MRN:  [id number]         Date:  2023      Surgeon: Phillip Acosta):  Ritesh Gordillo DO    Procedure: Procedure(s):  excision right inguinal lymphnode    Medications prior to admission:   Prior to Admission medications    Medication Sig Start Date End Date Taking? Authorizing Provider   estradiol (VIVELLE) 0.05 MG/24HR Place 1 patch onto the skin Twice a Week 23   Graciela Arroyo MD   montelukast (SINGULAIR) 10 MG tablet Take 1 tablet by mouth nightly    Historical Provider, MD   pantoprazole (PROTONIX) 20 MG tablet Take 1 tablet by mouth daily    Historical Provider, MD   Simethicone (GAS-X PO) Take by mouth as needed    Historical Provider, MD   magnesium oxide (MAG-OX) 400 MG tablet Take 1 tablet by mouth daily    Historical Provider, MD   butalbital-acetaminophen-caffeine (FIORICET, ESGIC) -40 MG per tablet Take 1 tablet by mouth every 4 hours as needed for Headaches Take one tablet at onset of migraine as needed. 22   Graciela Arroyo MD   linaclotide Akila Potash) 145 MCG capsule Take 2 capsules by mouth every morning (before breakfast) 20   Ar Automatic Reconciliation   zolpidem (AMBIEN CR) 12.5 MG extended release tablet Take 1 tablet by mouth nightly as needed.  20   Ar Automatic Reconciliation       Current medications:    Current Facility-Administered Medications   Medication Dose Route Frequency Provider Last Rate Last Admin    lidocaine 1 % injection 1 mL  1 mL IntraDERmal Once PRN Adwoa Barnhart IV, MD        lactated ringers IV soln infusion   IntraVENous Continuous Adwoa Barnhart IV,  mL/hr at 23 0723 New Bag at 23 0723    sodium chloride flush 0.9 % injection 5-40 mL  5-40 mL IntraVENous 2 times per day Adwoa Barnhart IV, MD        sodium chloride flush 0.9 % injection 5-40 mL  5-40 mL

## 2023-06-21 ENCOUNTER — OFFICE VISIT (OUTPATIENT)
Dept: SURGERY | Age: 63
End: 2023-06-21

## 2023-06-21 VITALS — BODY MASS INDEX: 19.61 KG/M2 | WEIGHT: 122 LBS | HEIGHT: 66 IN

## 2023-06-21 DIAGNOSIS — Z09 POSTOPERATIVE EXAMINATION: Primary | ICD-10-CM

## 2023-06-21 PROCEDURE — 99024 POSTOP FOLLOW-UP VISIT: CPT | Performed by: STUDENT IN AN ORGANIZED HEALTH CARE EDUCATION/TRAINING PROGRAM

## 2023-06-21 NOTE — PROGRESS NOTES
General Surgery Office Visit:    Pt presents s/p exc lymph node. Pt overall doing well, minimal pain. Tolerating diet and having bowel function. Medications:   Current Outpatient Medications   Medication Sig Dispense Refill    docusate sodium (COLACE) 100 MG capsule Take 1 capsule by mouth 2 times daily 60 capsule 0    ondansetron (ZOFRAN) 4 MG tablet Take 1 tablet by mouth 3 times daily as needed for Nausea or Vomiting 15 tablet 0    estradiol (VIVELLE) 0.05 MG/24HR Place 1 patch onto the skin Twice a Week 24 patch 3    montelukast (SINGULAIR) 10 MG tablet Take 1 tablet by mouth nightly      pantoprazole (PROTONIX) 20 MG tablet Take 1 tablet by mouth daily      Simethicone (GAS-X PO) Take by mouth as needed      magnesium oxide (MAG-OX) 400 MG tablet Take 1 tablet by mouth daily      butalbital-acetaminophen-caffeine (FIORICET, ESGIC) -40 MG per tablet Take 1 tablet by mouth every 4 hours as needed for Headaches Take one tablet at onset of migraine as needed. 30 tablet 11    linaclotide (LINZESS) 145 MCG capsule Take 2 capsules by mouth every morning (before breakfast)      zolpidem (AMBIEN CR) 12.5 MG extended release tablet Take 1 tablet by mouth nightly as needed. No current facility-administered medications for this visit. 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

## 2023-06-23 ENCOUNTER — CLINICAL DOCUMENTATION (OUTPATIENT)
Dept: ONCOLOGY | Age: 63
End: 2023-06-23

## 2023-06-23 NOTE — PROGRESS NOTES
Keysha Murphy called and left a message on the voicemail of my coworker letting us know she had gotten a denial letter regarding her genetic testing. I called Jordin Jorgensen and was assured that her out of pocket would be 0. I called and let Keysha Murphy know this.

## 2023-07-20 ENCOUNTER — NURSE ONLY (OUTPATIENT)
Dept: OBGYN CLINIC | Age: 63
End: 2023-07-20

## 2023-07-20 DIAGNOSIS — Z13.220 SCREENING CHOLESTEROL LEVEL: ICD-10-CM

## 2023-07-20 LAB
CHOLEST SERPL-MCNC: 247 MG/DL
HDLC SERPL-MCNC: 115 MG/DL (ref 40–60)
HDLC SERPL: 2.1
LDLC SERPL CALC-MCNC: 118.6 MG/DL
TRIGL SERPL-MCNC: 67 MG/DL (ref 35–150)
VLDLC SERPL CALC-MCNC: 13.4 MG/DL (ref 6–23)

## 2023-07-21 ENCOUNTER — TELEPHONE (OUTPATIENT)
Dept: OBGYN CLINIC | Age: 63
End: 2023-07-21

## 2023-07-21 NOTE — TELEPHONE ENCOUNTER
----- Message from Ranjan Walters MD sent at 7/21/2023 10:14 AM EDT -----  Elevated chol but very high HDL high ldl  Consider red yeast rice to lower ldl  ----- Message -----  From: Soumya Mcrae Incoming [place] [facility]  Sent: 7/20/2023   6:59 PM EDT  To: Ranjan Walters MD

## 2023-07-21 NOTE — TELEPHONE ENCOUNTER
Attempted to call patient but busy signal was noted and unable to leave voicemail. [employer] message sent to review lab results.

## 2023-07-21 NOTE — TELEPHONE ENCOUNTER
Spoke with patient regarding red yeast rice supplement. Pt is also following up with PCP in August and will reach out to them to discuss follow up labs and if they have any other suggestions besides the red yeast rice. All questions answered.

## 2023-08-29 ENCOUNTER — HOSPITAL ENCOUNTER (OUTPATIENT)
Dept: MRI IMAGING | Age: 63
Discharge: HOME | End: 2023-09-01
Payer: COMMERCIAL

## 2023-08-29 DIAGNOSIS — Z80.3 FAMILY HISTORY OF BREAST CANCER: ICD-10-CM

## 2023-08-29 DIAGNOSIS — N63.0 BREAST MASS IN FEMALE: ICD-10-CM

## 2023-08-29 DIAGNOSIS — Z98.890 HISTORY OF BREAST BIOPSY: ICD-10-CM

## 2023-08-29 PROCEDURE — C8908 MRI W/O FOL W/CONT, BREAST,: HCPCS

## 2023-08-29 PROCEDURE — 2580000003 HC RX 258: Performed by: OBSTETRICS & GYNECOLOGY

## 2023-08-29 PROCEDURE — 6360000004 HC RX CONTRAST MEDICATION: Performed by: OBSTETRICS & GYNECOLOGY

## 2023-08-29 PROCEDURE — A9579 GAD-BASE MR CONTRAST NOS,1ML: HCPCS | Performed by: OBSTETRICS & GYNECOLOGY

## 2023-08-29 RX ORDER — SODIUM CHLORIDE 0.9 % (FLUSH) 0.9 %
30 SYRINGE (ML) INJECTION AS NEEDED
Status: DISCONTINUED | OUTPATIENT
Start: 2023-08-29 | End: 2023-09-02 | Stop reason: HOSPADM

## 2023-08-29 RX ADMIN — GADOTERIDOL 11 ML: 279.3 INJECTION, SOLUTION INTRAVENOUS at 14:06

## 2023-08-29 RX ADMIN — SODIUM CHLORIDE, PRESERVATIVE FREE 30 ML: 5 INJECTION INTRAVENOUS at 14:06

## 2023-10-04 RX ORDER — DOCUSATE SODIUM 100 MG/1
100 CAPSULE, LIQUID FILLED ORAL 2 TIMES DAILY
Qty: 60 CAPSULE | Refills: 0 | Status: SHIPPED | OUTPATIENT
Start: 2023-10-04

## 2023-11-02 ENCOUNTER — OFFICE VISIT (OUTPATIENT)
Dept: RHEUMATOLOGY | Age: 63
End: 2023-11-02
Payer: COMMERCIAL

## 2023-11-02 VITALS
DIASTOLIC BLOOD PRESSURE: 62 MMHG | HEIGHT: 66 IN | WEIGHT: 123 LBS | HEART RATE: 77 BPM | TEMPERATURE: 97.5 F | SYSTOLIC BLOOD PRESSURE: 104 MMHG | BODY MASS INDEX: 19.77 KG/M2

## 2023-11-02 DIAGNOSIS — M81.6 LOCALIZED OSTEOPOROSIS WITHOUT CURRENT PATHOLOGICAL FRACTURE: Primary | ICD-10-CM

## 2023-11-02 DIAGNOSIS — G89.29 CHRONIC MIDLINE LOW BACK PAIN WITH SCIATICA, SCIATICA LATERALITY UNSPECIFIED: ICD-10-CM

## 2023-11-02 DIAGNOSIS — M54.40 CHRONIC MIDLINE LOW BACK PAIN WITH SCIATICA, SCIATICA LATERALITY UNSPECIFIED: ICD-10-CM

## 2023-11-02 DIAGNOSIS — R23.2 HOT FLASHES: ICD-10-CM

## 2023-11-02 PROCEDURE — 99244 OFF/OP CNSLTJ NEW/EST MOD 40: CPT | Performed by: INTERNAL MEDICINE

## 2023-11-02 RX ORDER — LORAZEPAM 0.5 MG/1
0.5 TABLET ORAL DAILY PRN
COMMUNITY
Start: 2023-05-25 | End: 2023-12-13

## 2023-11-02 RX ORDER — AZELASTINE HYDROCHLORIDE, FLUTICASONE PROPIONATE 137; 50 UG/1; UG/1
SPRAY, METERED NASAL
COMMUNITY
Start: 2023-10-14

## 2023-11-02 RX ORDER — CALCIUM POLYCARBOPHIL 625 MG
TABLET ORAL
COMMUNITY

## 2023-11-02 RX ORDER — DOXYCYCLINE HYCLATE 100 MG/1
CAPSULE ORAL
COMMUNITY
Start: 2023-11-01

## 2023-11-02 RX ORDER — CALCIUM CARBONATE/VITAMIN D3 600 MG-10
1 TABLET ORAL 2 TIMES DAILY
COMMUNITY
Start: 2023-08-17

## 2023-11-02 RX ORDER — CETIRIZINE HYDROCHLORIDE 10 MG/1
1 TABLET ORAL
COMMUNITY

## 2023-11-02 RX ORDER — TACROLIMUS 1 MG/G
OINTMENT TOPICAL
COMMUNITY

## 2023-11-02 RX ORDER — MELATONIN
1000 DAILY
Qty: 90 TABLET | Refills: 1 | Status: SHIPPED | OUTPATIENT
Start: 2023-11-02 | End: 2024-04-30

## 2023-11-02 RX ORDER — BETAMETHASONE DIPROPIONATE 0.5 MG/G
CREAM TOPICAL
COMMUNITY

## 2023-11-02 RX ORDER — CLINDAMYCIN PHOSPHATE 11.9 MG/ML
SOLUTION TOPICAL
COMMUNITY
Start: 2023-08-13

## 2023-11-02 RX ORDER — CALCIUM CITRATE 1040MG
1040 TABLET ORAL DAILY
Qty: 90 TABLET | Refills: 1 | Status: SHIPPED | OUTPATIENT
Start: 2023-11-02 | End: 2024-04-30

## 2023-11-02 RX ORDER — ALENDRONATE SODIUM 70 MG/1
70 TABLET ORAL
Qty: 12 TABLET | Refills: 1 | Status: SHIPPED | OUTPATIENT
Start: 2023-11-02 | End: 2024-04-30

## 2023-11-02 ASSESSMENT — ROUTINE ASSESSMENT OF PATIENT INDEX DATA (RAPID3)
WHEN YOU AWAKENED IN THE MORNING OVER THE LAST WEEK, PLEASE INDICATE THE AMOUNT OF TIME IT TAKES UNTIL YOU ARE AS LIMBER AS YOU WILL BE FOR THE DAY: 1 HOUR
ON A SCALE OF ONE TO TEN, CONSIDERING ALL THE WAYS IN WHICH ILLNESS AND HEALTH CONDITIONS MAY AFFECT YOU AT THIS TIME, PLEASE INDICATE BELOW HOW YOU ARE DOING:: 3
ON A SCALE OF ONE TO TEN, HOW MUCH OF A PROBLEM HAS UNUSUAL FATIGUE OR TIREDNESS BEEN FOR YOU OVER THE PAST WEEK?: 3
ON A SCALE OF ONE TO TEN, HOW DIFFICULT WAS IT FOR YOU TO COMPLETE THE LISTED DAILY PHYSICAL TASKS OVER THE LAST WEEK: 0.4
ON A SCALE OF ONE TO TEN, HOW MUCH PAIN HAVE YOU HAD BECAUSE OF YOUR CONDITION OVER THE PAST WEEK?: 5

## 2023-11-02 NOTE — ASSESSMENT & PLAN NOTE
-stable  -start alendronate 70mg po weekly, will likely plan to treat for 3-5 years  -Start calcium citrate 1200mg daily and vitamin D3 1000IU daily  - repeat DXA scan in 2 years, 5/2025    Side effects including 1-2% risk of ONJ, atypical fractures, hypocalcemia, esophagitis/heartburn, arthralgias/myalgias discussed with pt.

## 2023-11-02 NOTE — PATIENT INSTRUCTIONS
You have osteoporosis, I would recommend starting treatment with alendronate (fosomax) 70mg by mouth once a week. Get up take the fosamax 30 minutes before all other medications with a full glass of water and make sure you're upright for 30 minutes after taking it. Side effects including 1-2% risk of ONJ, atypical fractures, hypocalcemia, esophagitis/heartburn, arthralgias/myalgias discussed with pt. Would recommend:  Medications:   - 3-4 Dairy products / day OR   - calcium citrate 6506-7961 mg daily   - Vitamin D 600-800IU daily  Lifestyle modifications:   -Regular weight-bearing or resistance-training exercise   -Smoking cessation   -Alcohol limitation    For back pain  Ibuprofen 400mg (2 tab) up to three times a day 1-2 weeks and then as needed. Always taking with food.   Tylenol 1000mg ( two 500mg tablets = extra strength) up to three times a day  Icy hot patches (lidocaine)  Go to physical therapy  Continue heating pad

## 2023-11-02 NOTE — PROGRESS NOTES
tablet, Take 1 tablet by mouth daily, Disp: , Rfl:     Simethicone (GAS-X PO), Take by mouth as needed, Disp: , Rfl:     magnesium oxide (MAG-OX) 400 MG tablet, Take 1 tablet by mouth daily, Disp: , Rfl:     butalbital-acetaminophen-caffeine (FIORICET, ESGIC) -40 MG per tablet, Take 1 tablet by mouth every 4 hours as needed for Headaches Take one tablet at onset of migraine as needed. , Disp: 30 tablet, Rfl: 11    linaclotide (LINZESS) 145 MCG capsule, Take 2 capsules by mouth every morning (before breakfast), Disp: , Rfl:     zolpidem (AMBIEN CR) 12.5 MG extended release tablet, Take 1 tablet by mouth nightly as needed. , Disp: , Rfl:     Azelastine-Fluticasone 137-50 MCG/ACT SUSP, SPRAY 1 SPRAY INTO EACH NOSTRIL TWICE A DAY, Disp: , Rfl:     betamethasone dipropionate 0.05 % cream, Apply topically, Disp: , Rfl:     calcium carb-cholecalciferol 600-10 MG-MCG TABS per tab, Take 1 tablet by mouth 2 times daily, Disp: , Rfl:     cetirizine (ZYRTEC) 10 MG tablet, Take 1 tablet by mouth, Disp: , Rfl:     clindamycin (CLEOCIN T) 1 % external solution, APPLY TO BUMPS EVERY MORNING, Disp: , Rfl:     doxycycline hyclate (VIBRAMYCIN) 100 MG capsule, , Disp: , Rfl:     tacrolimus (PROTOPIC) 0.1 % ointment, Apply topically, Disp: , Rfl:     tretinoin (RETIN-A) 0.025 % cream, APPLY SMALL AMOUNT TO DRY FACE EVERY NIGHT., Disp: , Rfl:     polycarbophil (FIBERCON) 625 MG tablet, Take by mouth, Disp: , Rfl:     ALLERGIES:  Allergies   Allergen Reactions    Pneumococcal Vaccine Swelling     Serious swelling, discoloration    Codeine Nausea And Vomiting and Nausea Only     And sulfate         SOCIAL HISTORY:   reports that she has never smoked. She has never used smokeless tobacco. She reports current alcohol use of about 1.7 standard drinks of alcohol per week. She reports that she does not use drugs.     FAMILY HISTORY:  Family History   Problem Relation Age of Onset    Macular Degen Mother     Diabetes Father     Prostate

## 2023-11-02 NOTE — ASSESSMENT & PLAN NOTE
-Start Ibuprofen 400mg (2 tab) up to three times a day 1-2 weeks and then as needed. Always take with food. Tylenol 1000mg up to three times a day prn  Icy hot patches (lidocaine)   Go to physical therapy  Continue heating pad      Advised patient of side effects including hypertension, increased cardiovascular risk of MI, heart failure, stroke, renal & liver toxicity, GI upset and bleeding.

## 2023-11-13 ENCOUNTER — HOSPITAL ENCOUNTER (OUTPATIENT)
Dept: PHYSICAL THERAPY | Age: 63
Setting detail: RECURRING SERIES
Discharge: HOME | End: 2023-11-16
Attending: INTERNAL MEDICINE
Payer: COMMERCIAL

## 2023-11-13 ENCOUNTER — TELEPHONE (OUTPATIENT)
Dept: RHEUMATOLOGY | Age: 63
End: 2023-11-13

## 2023-11-13 DIAGNOSIS — M54.59 OTHER LOW BACK PAIN: Primary | ICD-10-CM

## 2023-11-13 DIAGNOSIS — M25.551 PAIN IN RIGHT HIP: ICD-10-CM

## 2023-11-13 PROCEDURE — 97161 PT EVAL LOW COMPLEX 20 MIN: CPT

## 2023-11-13 PROCEDURE — 97530 THERAPEUTIC ACTIVITIES: CPT

## 2023-11-13 ASSESSMENT — PAIN SCALES - GENERAL: PAINLEVEL_OUTOF10: 4

## 2023-11-13 NOTE — PROGRESS NOTES
[facility]  [facility] 1960  Primary: Efraín Moore Sc ([facility])  Secondary:  [address] @ [address] [facility],[address] [place] [address]  [address] [facility] [address]  Phone: [phone]  Fax: [phone] Plan Frequency: 1x every 2 weeks, with 1 week of POC being back to back 2/2 holidays  Plan of Care/Certification Expiration Date: 24            >PT Visit Info:  Plan Frequency: 1x every 2 weeks, with 1 week of POC being back to back 2/2 holidays  Plan of Care/Certification Expiration Date: 24  Total # of Visits Approved: 72  Total # of Visits to Date: 1  Progress Note Counter: 1  Progress Note Due Date: 24 (or 10th visit)      Visit Count: 1    OUTPATIENT PHYSICAL THERAPY:  Treatment Note 2023       Episode (LBP with radicular sx into B post mid thigh) Appt Desk             Treatment Diagnosis:    Visit Diagnoses         Codes    Other low back pain    -  Primary M54.59    Pain in right hip     M25.551          Medical/Referring Diagnosis:   Chronic midline low back pain with sciatica, sciatica laterality unspecified [M54.40, G89.29]  Referring Physician: Gloria Kraus DO MD Orders: PT Eval and Treat  Date of Onset: Onset Date:  (worsening over the past year)       Allergies: Pneumococcal vaccine and Codeine  Restrictions/Precautions:  Restrictions/Precautions: -- (osteoporosis)  No data recorded     Interventions Planned (Treatment may consist of any combination of the following):    Current Treatment Recommendations: Strengthening; Endurance training; Neuromuscular re-education; Manual; Home exercise program; Modalities;  Therapeutic activities       >Subjective Comments:  See PT evaluation from today    >Initial:     4/10       >Post Session:       4/10     Medications Last Reviewed: 2023    Updated Objective Findings: N/A    Treatment     GAIT TRAINING: (0 minutes): Gait training to improve and/or restore physical functioning as related to mobility, strength, balance

## 2023-11-13 NOTE — THERAPY EVALUATION
Genie Schmitt  : 1960  Primary: Mildred Palomares Sc ([facility])  Secondary:  [address] @ [address] [place] [address] [facility] [address]  Phone: [phone]  Fax: [phone] Plan Frequency: 1x every 2 weeks, with 1 week of POC being back to back /2 holidays    Plan of Care/Certification Expiration Date: 24            PT Visit Info:  Plan Frequency: 1x every 2 weeks, with 1 week of POC being back to back /2 holidays  Plan of Care/Certification Expiration Date: 24  Total # of Visits Approved: 72  Total # of Visits to Date: 1  Progress Note Counter: 1  Progress Note Due Date: 24 (or 10th visit)      Visit Count: 1                OUTPATIENT PHYSICAL THERAPY:  Initial Assessment 2023   Episode (LBP with radicular sx into B post mid thigh)   Appt Desk         Treatment Diagnosis:    Visit Diagnoses         Codes    Other low back pain    -  Primary M54.59    Pain in right hip     M25.551            Medical/Referring Diagnosis:   Chronic midline low back pain with sciatica, sciatica laterality unspecified [M54.40, G89.29]  Surgeon: N/A  Referring Provider: Luisana Ross DO MD Orders: PT Eval and Treat   Return MD Appt: TBD    Date of Onset: Onset Date:  (worsening over the past year)       Allergies: Pneumococcal vaccine and Codeine  Restrictions/Precautions:    Restrictions/Precautions: -- (osteoporosis)         Medications Last Reviewed:  2023       SUBJECTIVE     History of Injury/Illness (Reason for Referral):  23: Genie Schmitt relates she hurt her back years ago when lifting but it has gotten really bad over the past year. She was recently dx with osteoporosis and has begun taking medication for this. She is waiting on x-rays for her back. No injections to date. She did have imaging done when she first lifted the chair 20 years ago but has not had anything done recently.  She does experience UI and has to wear a daily pad - began

## 2023-11-28 ENCOUNTER — HOSPITAL ENCOUNTER (OUTPATIENT)
Dept: PHYSICAL THERAPY | Age: 63
Setting detail: RECURRING SERIES
Discharge: HOME | End: 2023-12-01
Attending: INTERNAL MEDICINE
Payer: COMMERCIAL

## 2023-11-28 ENCOUNTER — HOSPITAL ENCOUNTER (OUTPATIENT)
Dept: GENERAL RADIOLOGY | Age: 63
Discharge: HOME | End: 2023-12-01
Payer: COMMERCIAL

## 2023-11-28 DIAGNOSIS — M54.40 CHRONIC MIDLINE LOW BACK PAIN WITH SCIATICA, SCIATICA LATERALITY UNSPECIFIED: ICD-10-CM

## 2023-11-28 DIAGNOSIS — G89.29 CHRONIC MIDLINE LOW BACK PAIN WITH SCIATICA, SCIATICA LATERALITY UNSPECIFIED: ICD-10-CM

## 2023-11-28 DIAGNOSIS — M25.551 PAIN IN RIGHT HIP: ICD-10-CM

## 2023-11-28 DIAGNOSIS — M54.59 OTHER LOW BACK PAIN: Primary | ICD-10-CM

## 2023-11-28 PROCEDURE — 72110 X-RAY EXAM L-2 SPINE 4/>VWS: CPT

## 2023-11-28 PROCEDURE — 97110 THERAPEUTIC EXERCISES: CPT

## 2023-11-28 ASSESSMENT — PAIN SCALES - GENERAL: PAINLEVEL_OUTOF10: 4

## 2023-11-28 NOTE — PROGRESS NOTES
Leo Gerardo  : 1960  Primary: Harish Jack Miramontes ([facility]  Secondary:  [address] @ [address] [facility],[address] [place] [address]  [address] [facility] [address]  Phone: [phone]  Fax: [phone] Plan Frequency: 1x every 2 weeks, with 1 week of POC being back to back 2/2 holidays  Plan of Care/Certification Expiration Date: 24            >PT Visit Info:  Plan Frequency: 1x every 2 weeks, with 1 week of POC being back to back 2/2 holidays  Plan of Care/Certification Expiration Date: 24  Total # of Visits Approved: 72  Total # of Visits to Date: 2  Progress Note Counter: 2  Progress Note Due Date: 24 (or 10th visit)      Visit Count: 2    OUTPATIENT PHYSICAL THERAPY:  Treatment Note 2023       Episode (LBP with radicular sx into B post mid thigh) Appt Desk             Treatment Diagnosis:    Visit Diagnoses         Codes    Other low back pain    -  Primary M54.59    Pain in right hip     M25.551          Medical/Referring Diagnosis:   Chronic midline low back pain with sciatica, sciatica laterality unspecified [M54.40, G89.29]  Referring Physician: Dyan Jimenez DO MD Orders: PT Eval and Treat  Date of Onset: Onset Date:  (worsening over the past year)       Allergies: Pneumococcal vaccine and Codeine  Restrictions/Precautions:  Restrictions/Precautions: -- (osteoporosis)  No data recorded     Interventions Planned (Treatment may consist of any combination of the following):    Current Treatment Recommendations: Strengthening; Endurance training; Neuromuscular re-education; Manual; Home exercise program; Modalities; Therapeutic activities       >Subjective Comments: more pain in the left hip today / lately. Just achy. She is getting her hip x-ray after today.     >Initial:     4/10       >Post Session:        /10     Medications Last Reviewed: 2023    Updated Objective Findings: N/A    Treatment     GAIT TRAINING: (0 minutes): Gait training to improve and/or restore

## 2024-02-20 ENCOUNTER — HOSPITAL ENCOUNTER (OUTPATIENT)
Dept: MAMMOGRAPHY | Age: 64
Discharge: HOME | End: 2024-02-23
Attending: OBSTETRICS & GYNECOLOGY
Payer: COMMERCIAL

## 2024-02-20 VITALS — WEIGHT: 125 LBS | BODY MASS INDEX: 20.09 KG/M2 | HEIGHT: 66 IN

## 2024-02-20 DIAGNOSIS — Z80.3 FAMILY HISTORY OF BREAST CANCER: ICD-10-CM

## 2024-02-20 DIAGNOSIS — Z12.31 SCREENING MAMMOGRAM FOR BREAST CANCER: ICD-10-CM

## 2024-02-20 PROCEDURE — 77063 BREAST TOMOSYNTHESIS BI: CPT

## 2024-02-27 ENCOUNTER — TELEPHONE (OUTPATIENT)
Dept: OBGYN CLINIC | Age: 64
End: 2024-02-27

## 2024-02-27 NOTE — TELEPHONE ENCOUNTER
----- Message from Sanna Vela MD sent at 2/25/2024  8:12 AM EST -----  Offer mri risk > 20%  ----- Message -----  From: Soumya Mcrae Incoming Orders Results To [facility]  Sent: 2/24/2024   3:02 PM EST  To: Sanna Vela MD

## 2024-03-06 ENCOUNTER — TELEPHONE (OUTPATIENT)
Dept: OBGYN CLINIC | Age: 64
End: 2024-03-06

## 2024-03-06 DIAGNOSIS — Z91.89 INCREASED RISK OF BREAST CANCER: ICD-10-CM

## 2024-03-06 DIAGNOSIS — Z80.3 FAMILY HISTORY OF BREAST CANCER IN FIRST DEGREE RELATIVE: Primary | ICD-10-CM

## 2024-03-06 DIAGNOSIS — R92.30 DENSE BREAST TISSUE: ICD-10-CM

## 2024-03-06 DIAGNOSIS — Z98.890 HISTORY OF LEFT BREAST BIOPSY: ICD-10-CM

## 2024-04-02 DIAGNOSIS — M81.6 LOCALIZED OSTEOPOROSIS WITHOUT CURRENT PATHOLOGICAL FRACTURE: Primary | ICD-10-CM

## 2024-04-09 ENCOUNTER — TELEPHONE (OUTPATIENT)
Dept: RHEUMATOLOGY | Age: 64
End: 2024-04-09

## 2024-04-09 NOTE — TELEPHONE ENCOUNTER
----- Message from Essie Ryan sent at 4/3/2024 11:36 AM EDT -----  Regarding: RE: prolia prior auth  2024 - Prolia - BCBS - bif sent 4/3/2024   ----- Message -----  From: Olivia Mojica MA  Sent: 4/3/2024  10:35 AM EDT  To: Essie Ryan; Olivia Mojica MA  Subject: FW: prolia prior auth                            Please verify coverage for Prolia  Thanks  ----- Message -----  From: Gary Mullen DO  Sent: 4/2/2024   4:14 PM EDT  To: Olivia Mojica MA  Subject: prolia prior auth                                Hi olivia,    This patient has severe GERD with fosamax. She would like to change her therapy to prolia 60mg subq every 6 months which I am okay with. Can we see if this can be approved. She prefers this over reclast.     Thanks,  Gary

## 2024-04-09 NOTE — TELEPHONE ENCOUNTER
Benefits received from [employer] for Prolia/   BCBS- PA required- Prolia and administration will be subject to $1,600.00 deductible ($1,600.00 met) and 20% coinsurance up to a $3,200.00 out of pocket max ($2,053.90 met). Deductible does contribute to the OOP max.    PHONE CALL to [facility] to start PA for Prolia, . Not able to reach a rep. Will call back.

## 2024-04-12 DIAGNOSIS — M81.6 LOCALIZED OSTEOPOROSIS WITHOUT CURRENT PATHOLOGICAL FRACTURE: ICD-10-CM

## 2024-04-12 LAB
25(OH)D3 SERPL-MCNC: 57.5 NG/ML (ref 30–100)
ANION GAP SERPL CALC-SCNC: 2 MMOL/L (ref 2–11)
BUN SERPL-MCNC: 21 MG/DL (ref 8–23)
CALCIUM SERPL-MCNC: 9.2 MG/DL (ref 8.3–10.4)
CHLORIDE SERPL-SCNC: 105 MMOL/L (ref 103–113)
CO2 SERPL-SCNC: 31 MMOL/L (ref 21–32)
CREAT SERPL-MCNC: 0.8 MG/DL (ref 0.6–1)
GLUCOSE SERPL-MCNC: 87 MG/DL (ref 65–100)
POTASSIUM SERPL-SCNC: 4.5 MMOL/L (ref 3.5–5.1)
SODIUM SERPL-SCNC: 138 MMOL/L (ref 136–146)

## 2024-04-15 RX ORDER — ALENDRONATE SODIUM 70 MG/1
70 TABLET ORAL
Qty: 4 TABLET | Refills: 2 | Status: SHIPPED | OUTPATIENT
Start: 2024-04-15 | End: 2024-07-14

## 2024-04-15 NOTE — TELEPHONE ENCOUNTER
PHONE CALL to [facility] to start PA for Prolia.  Avelina GODFREY  Inj, OV, Admin- all same benefits. 80% of allowed amount. No copay. Ded $1,600 ($1600 met ) 3200 OOP max 1996. 30  met. Lifetime max $5,000,000 (42,000 met) calendar year. PA required for .   [employer] [facility] [phone] option 2 for medical auth. Can buy and bill. SP is [facility] SP if not.   Reference # [id number].     PHONE CALL to [facility] PA. Spoke with Lacie GODFREY LPN.   Patient has tried Fosamax, but not injectable OP meds. May need to try and fail Reclast? Need to send clinicals to [facility]   Auth # [id number] (pending auth)   Fax to [phone].   Sanna faxing clinicals for me today.

## 2024-04-16 NOTE — TELEPHONE ENCOUNTER
PA for Prolia has been approved 4/15/24- 4/14/25. [facility]. PA# [id number]. Scheduled patient for 4/22/24 1:30 for Prolia #1.   Dr. Mullen said her labs do not need to be repeated prior to her OV 5/2/24

## 2024-04-22 ENCOUNTER — NURSE ONLY (OUTPATIENT)
Dept: RHEUMATOLOGY | Age: 64
End: 2024-04-22
Payer: COMMERCIAL

## 2024-04-22 VITALS
TEMPERATURE: 97.6 F | DIASTOLIC BLOOD PRESSURE: 67 MMHG | BODY MASS INDEX: 20.18 KG/M2 | HEART RATE: 70 BPM | SYSTOLIC BLOOD PRESSURE: 96 MMHG | WEIGHT: 125 LBS

## 2024-04-22 DIAGNOSIS — M81.6 LOCALIZED OSTEOPOROSIS WITHOUT CURRENT PATHOLOGICAL FRACTURE: Primary | ICD-10-CM

## 2024-04-22 PROCEDURE — 96372 THER/PROPH/DIAG INJ SC/IM: CPT | Performed by: INTERNAL MEDICINE

## 2024-04-22 NOTE — PROGRESS NOTES
[facility]  [address] [place] [address], [address]  [place] [address]  Office : [phone], Fax: [phone]       # 1 Prolia 60mg/ml injected SC today into left arm. Patient tolerated injection well. Advised patient to continue with calcium and vitamin D post injection. Advised patient to call with any problems post injection.   Medication ordered by Dr. Mullen.   Injection given by Aishwarya Izaguirre,     Pre-infusion/injection questionairre for osteoporosis    1. Have you had or are you planning any dental work?  no    2. Are you taking your calcium and vitamin D? yes    3. When was your last osteoporosis treatment? none    4. Have you had any recent fractures? no    5. Are you currently in skilled nursing or in patient rehab? no

## 2024-04-29 SDOH — ECONOMIC STABILITY: HOUSING INSECURITY
IN THE LAST 12 MONTHS, WAS THERE A TIME WHEN YOU DID NOT HAVE A STEADY PLACE TO SLEEP OR SLEPT IN A SHELTER (INCLUDING NOW)?: PATIENT DECLINED

## 2024-04-29 SDOH — ECONOMIC STABILITY: INCOME INSECURITY: HOW HARD IS IT FOR YOU TO PAY FOR THE VERY BASICS LIKE FOOD, HOUSING, MEDICAL CARE, AND HEATING?: PATIENT DECLINED

## 2024-04-29 SDOH — ECONOMIC STABILITY: TRANSPORTATION INSECURITY
IN THE PAST 12 MONTHS, HAS LACK OF TRANSPORTATION KEPT YOU FROM MEETINGS, WORK, OR FROM GETTING THINGS NEEDED FOR DAILY LIVING?: PATIENT DECLINED

## 2024-04-29 SDOH — ECONOMIC STABILITY: FOOD INSECURITY: WITHIN THE PAST 12 MONTHS, YOU WORRIED THAT YOUR FOOD WOULD RUN OUT BEFORE YOU GOT MONEY TO BUY MORE.: PATIENT DECLINED

## 2024-04-29 SDOH — ECONOMIC STABILITY: FOOD INSECURITY: WITHIN THE PAST 12 MONTHS, THE FOOD YOU BOUGHT JUST DIDN'T LAST AND YOU DIDN'T HAVE MONEY TO GET MORE.: PATIENT DECLINED

## 2024-04-29 NOTE — PROGRESS NOTES
HPI:  Ms. Landry is a 63 y.o.   OB History          0    Para        Term   0       0    AB   0    Living   0         SAB        IAB        Ectopic        Molar        Multiple        Live Births                 who is here today for a well woman exam. She complains of nothing.  Saw rheumatology on prolia  Seeing neurologist  Needs refills on Fioricet and Estradiol patches (90 day  No bleeding  Still has numbness after lymph node removal on right  Seeing neurology for neuropathy of right toe and headaches     Date Performed Result   PAP 22 Neg, HPV Neg   Mammogram 24 Benign   Colonoscopy 22 Internal Hemorrhoids   Dexa 23 10 year probability of major osteoporotic fracture:  11.3%  10 year probability of hip fracture:  2.7%          GYN History           No LMP recorded. Patient is postmenopausal. 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    Chronic tension-type headache, not intractable 2018    Colon polyp     Benign    Constipation     Cystic acne     spironolactone    GERD (gastroesophageal reflux disease) 2018    IBS (irritable bowel syndrome)     Insomnia     Low back pain     Mass of right inguinal region     Menopause     Muscle spasm     OA (osteoarthritis)     Other acne 2018    Ovarian cyst     Polyarthritis 2018    Associated with enteritis per pt report diagnosed in [place] by her PCP there    PONV (postoperative nausea and vomiting)     Postmenopausal     Rectocele     Ruptured appendix     Ruptured disk     Unspecified adverse effect of anesthesia     hard to wake up    Viral meningitis        Past Surgical History:  Past Surgical History:   Procedure Laterality Date    APPENDECTOMY  2013    BREAST BIOPSY Left 2021    stereo bx for calcs    COLONOSCOPY  10/05/2015    Polyp- Benign

## 2024-05-02 ENCOUNTER — OFFICE VISIT (OUTPATIENT)
Dept: OBGYN CLINIC | Age: 64
End: 2024-05-02
Payer: COMMERCIAL

## 2024-05-02 ENCOUNTER — OFFICE VISIT (OUTPATIENT)
Dept: RHEUMATOLOGY | Age: 64
End: 2024-05-02
Payer: COMMERCIAL

## 2024-05-02 VITALS
BODY MASS INDEX: 20.51 KG/M2 | SYSTOLIC BLOOD PRESSURE: 118 MMHG | DIASTOLIC BLOOD PRESSURE: 72 MMHG | WEIGHT: 127.6 LBS | HEIGHT: 66 IN

## 2024-05-02 VITALS
HEART RATE: 76 BPM | BODY MASS INDEX: 20.41 KG/M2 | WEIGHT: 127 LBS | SYSTOLIC BLOOD PRESSURE: 113 MMHG | DIASTOLIC BLOOD PRESSURE: 79 MMHG | HEIGHT: 66 IN | TEMPERATURE: 97.9 F

## 2024-05-02 DIAGNOSIS — G62.9 NEUROPATHY: ICD-10-CM

## 2024-05-02 DIAGNOSIS — Z12.31 SCREENING MAMMOGRAM FOR BREAST CANCER: ICD-10-CM

## 2024-05-02 DIAGNOSIS — M81.6 LOCALIZED OSTEOPOROSIS WITHOUT CURRENT PATHOLOGICAL FRACTURE: ICD-10-CM

## 2024-05-02 DIAGNOSIS — Z13.89 SCREENING FOR GENITOURINARY CONDITION: ICD-10-CM

## 2024-05-02 DIAGNOSIS — M81.6 LOCALIZED OSTEOPOROSIS WITHOUT CURRENT PATHOLOGICAL FRACTURE: Primary | ICD-10-CM

## 2024-05-02 DIAGNOSIS — M13.0 POLYARTHRITIS: ICD-10-CM

## 2024-05-02 DIAGNOSIS — Z01.419 WELL WOMAN EXAM: Primary | ICD-10-CM

## 2024-05-02 LAB
BILIRUBIN, URINE, POC: NEGATIVE
BLOOD URINE, POC: NEGATIVE
ERYTHROCYTE [SEDIMENTATION RATE] IN BLOOD: 1 MM/HR (ref 0–30)
GLUCOSE URINE, POC: NEGATIVE
KETONES, URINE, POC: NEGATIVE
LEUKOCYTE ESTERASE, URINE, POC: NEGATIVE
NITRITE, URINE, POC: NEGATIVE
PH, URINE, POC: 7 (ref 4.6–8)
PROTEIN,URINE, POC: NEGATIVE
SPECIFIC GRAVITY, URINE, POC: 1.02 (ref 1–1.03)
URINALYSIS CLARITY, POC: CLEAR
URINALYSIS COLOR, POC: YELLOW
UROBILINOGEN, POC: NORMAL

## 2024-05-02 PROCEDURE — 99214 OFFICE O/P EST MOD 30 MIN: CPT | Performed by: INTERNAL MEDICINE

## 2024-05-02 PROCEDURE — 99396 PREV VISIT EST AGE 40-64: CPT | Performed by: OBSTETRICS & GYNECOLOGY

## 2024-05-02 PROCEDURE — 81002 URINALYSIS NONAUTO W/O SCOPE: CPT | Performed by: OBSTETRICS & GYNECOLOGY

## 2024-05-02 RX ORDER — BUTALBITAL, ACETAMINOPHEN AND CAFFEINE 50; 325; 40 MG/1; MG/1; MG/1
1 TABLET ORAL EVERY 4 HOURS PRN
Qty: 30 TABLET | Refills: 11 | Status: SHIPPED | OUTPATIENT
Start: 2024-05-02

## 2024-05-02 RX ORDER — ESTRADIOL 0.05 MG/D
1 PATCH, EXTENDED RELEASE TRANSDERMAL
Qty: 24 PATCH | Refills: 3 | Status: SHIPPED | OUTPATIENT
Start: 2024-05-02

## 2024-05-02 RX ORDER — CEPHALEXIN 250 MG/1
250 CAPSULE ORAL DAILY
COMMUNITY
Start: 2024-04-01

## 2024-05-02 RX ORDER — SULFACETAMIDE SODIUM, SULFUR 100; 50 MG/G; MG/G
EMULSION TOPICAL
COMMUNITY
Start: 2024-02-20

## 2024-05-02 RX ORDER — ALBUTEROL SULFATE 90 UG/1
2 AEROSOL, METERED RESPIRATORY (INHALATION) EVERY 4 HOURS PRN
COMMUNITY
Start: 2024-01-11

## 2024-05-02 RX ORDER — FEXOFENADINE HCL 180 MG/1
180 TABLET ORAL DAILY
COMMUNITY

## 2024-05-02 ASSESSMENT — ROUTINE ASSESSMENT OF PATIENT INDEX DATA (RAPID3)
ON A SCALE OF ONE TO TEN, HOW MUCH PAIN HAVE YOU HAD BECAUSE OF YOUR CONDITION OVER THE PAST WEEK?: 5
ON A SCALE OF ONE TO TEN, HOW DIFFICULT WAS IT FOR YOU TO COMPLETE THE LISTED DAILY PHYSICAL TASKS OVER THE LAST WEEK: 0.2
ON A SCALE OF ONE TO TEN, CONSIDERING ALL THE WAYS IN WHICH ILLNESS AND HEALTH CONDITIONS MAY AFFECT YOU AT THIS TIME, PLEASE INDICATE BELOW HOW YOU ARE DOING:: 5
WHEN YOU AWAKENED IN THE MORNING OVER THE LAST WEEK, PLEASE INDICATE THE AMOUNT OF TIME IT TAKES UNTIL YOU ARE AS LIMBER AS YOU WILL BE FOR THE DAY: 1 HOUR
ON A SCALE OF ONE TO TEN, HOW MUCH OF A PROBLEM HAS UNUSUAL FATIGUE OR TIREDNESS BEEN FOR YOU OVER THE PAST WEEK?: 5

## 2024-05-02 ASSESSMENT — JOINT PAIN
TOTAL NUMBER OF TENDER JOINTS: 0
TOTAL NUMBER OF SWOLLEN JOINTS: 0

## 2024-05-02 NOTE — PATIENT INSTRUCTIONS
Get labs done today (for nerve problem)  Get labs done 10/1/2024 for osteoporosis   Read about trying either gabapentin or cymbalta for nerve problems  Read about celebrex which would replace motrin if needed and is not going to harm the stomach  Schedule the neurology appointment  Return for f/u in 6 months w/next prolia injection

## 2024-05-02 NOTE — PROGRESS NOTES
TRANSITIONAL CARE PLANNING/ 2 Rehab [facility] Day: 5    Reason for Admission: SAH (subarachnoid hemorrhage) ([facility] [address] [I60.9]     Treatment Plan of Care:neurosurgery     Tests/Procedures still needed:     Barriers to Discharge: ANGELICA drain    Readmission Risk              Risk of Unplanned Readmission:        11            Patient goals/Treatment Preferences/Transitional Plan:     Referrals Made: Rehab of [address] [place] [address]    Follow Up needed:     [facility] [address] [place] [address] she will need updated PT/OT notes before she can start precert. Uterine Cancer Maternal Grandmother     Heart Attack Maternal Grandfather     Lymphoma Maternal Grandfather     Breast Cancer Paternal Grandmother 75    Heart Attack Paternal Grandfather     Breast Cancer Maternal Aunt     Crohn's Disease Other        Social History  Social History     Socioeconomic History    Marital status:      Spouse name: None    Number of children: None    Years of education: None    Highest education level: None   Occupational History    Occupation: worked admin   Tobacco Use    Smoking status: Never    Smokeless tobacco: Never   Vaping Use    Vaping Use: Never used   Substance and Sexual Activity    Alcohol use: Yes     Alcohol/week: 1.7 standard drinks of alcohol     Types: 2 Standard drinks or equivalent per week     Comment: occ    Drug use: No    Sexual activity: Yes     Partners: Male     Birth control/protection: Surgical     Comment: Vasectomy               Allergy:  Allergies   Allergen Reactions    Pneumococcal Vaccine Swelling     Serious swelling, discoloration    Codeine Nausea And Vomiting and Nausea Only     And sulfate           Current Medications:  Outpatient Encounter Medications as of 5/2/2024   Medication Sig Dispense Refill    albuterol sulfate HFA (PROVENTIL;VENTOLIN;PROAIR) 108 (90 Base) MCG/ACT inhaler Inhale 2 puffs into the lungs every 4 hours as needed      cephALEXin (KEFLEX) 250 MG capsule Take 1 capsule by mouth daily      Sulfacetamide Sodium-Sulfur 10-5 % LIQD USE AS DIRECTED TO WASH ONE TIME DAILY      fexofenadine (ALLEGRA) 180 MG tablet 1 tablet daily      [DISCONTINUED] alendronate (FOSAMAX) 70 MG tablet Take 1 tablet by mouth every 7 days 4 tablet 2    Azelastine-Fluticasone 137-50 MCG/ACT SUSP SPRAY 1 SPRAY INTO EACH NOSTRIL TWICE A DAY      calcium carb-cholecalciferol 600-10 MG-MCG TABS per tab Take 1 tablet by mouth 2 times daily      cetirizine (ZYRTEC) 10 MG tablet Take 1 tablet by mouth      clindamycin (CLEOCIN T) 1 % external solution

## 2024-05-06 LAB
ALBUMIN MFR UR ELPH: 12.3 %
ALPHA1 GLOB MFR UR ELPH: 1.8 %
ALPHA2 GLOB 24H MFR UR ELPH: 8.4 %
B-GLOBULIN 24H MFR UR ELPH: 56 %
GAMMA GLOB 24H MFR UR ELPH: 21.4 %
LABORATORY COMMENT REPORT: NORMAL
M PROTEIN MFR UR ELPH: NORMAL %
PROT UR-MCNC: 6.6 MG/DL

## 2024-05-10 LAB
ALBUMIN SERPL ELPH-MCNC: 4.3 G/DL (ref 2.9–4.4)
ALBUMIN/GLOB SERPL: 1.8 (ref 0.7–1.7)
ALPHA1 GLOB SERPL ELPH-MCNC: 0.2 G/DL (ref 0–0.4)
ALPHA2 GLOB SERPL ELPH-MCNC: 0.5 G/DL (ref 0.4–1)
B-GLOBULIN SERPL ELPH-MCNC: 0.8 G/DL (ref 0.7–1.3)
GAMMA GLOB SERPL ELPH-MCNC: 0.9 G/DL (ref 0.4–1.8)
GLOBULIN SER-MCNC: 2.4 G/DL (ref 2.2–3.9)
IGA SERPL-MCNC: 239 MG/DL (ref 87–352)
IGG SERPL-MCNC: 984 MG/DL (ref 586–1602)
IGM SERPL-MCNC: 54 MG/DL (ref 26–217)
INTERPRETATION SERPL IEP-IMP: ABNORMAL
M PROTEIN SERPL ELPH-MCNC: ABNORMAL G/DL
PROT SERPL-MCNC: 6.7 G/DL (ref 6–8.5)

## 2024-08-30 ENCOUNTER — HOSPITAL ENCOUNTER (OUTPATIENT)
Dept: MRI IMAGING | Age: 64
Discharge: HOME | End: 2024-08-30
Attending: OBSTETRICS & GYNECOLOGY
Payer: COMMERCIAL

## 2024-08-30 DIAGNOSIS — Z91.89 INCREASED RISK OF BREAST CANCER: ICD-10-CM

## 2024-08-30 DIAGNOSIS — R92.30 DENSE BREAST TISSUE: ICD-10-CM

## 2024-08-30 DIAGNOSIS — Z80.3 FAMILY HISTORY OF BREAST CANCER IN FIRST DEGREE RELATIVE: ICD-10-CM

## 2024-08-30 DIAGNOSIS — Z98.890 HISTORY OF LEFT BREAST BIOPSY: ICD-10-CM

## 2024-08-30 PROCEDURE — 6360000004 HC RX CONTRAST MEDICATION: Performed by: OBSTETRICS & GYNECOLOGY

## 2024-08-30 PROCEDURE — A9579 GAD-BASE MR CONTRAST NOS,1ML: HCPCS | Performed by: OBSTETRICS & GYNECOLOGY

## 2024-08-30 PROCEDURE — C8908 MRI W/O FOL W/CONT, BREAST,: HCPCS

## 2024-08-30 RX ADMIN — GADOTERIDOL 11 ML: 279.3 INJECTION, SOLUTION INTRAVENOUS at 13:18

## 2024-09-05 ENCOUNTER — OFFICE VISIT (OUTPATIENT)
Dept: NEUROLOGY | Age: 64
End: 2024-09-05
Payer: COMMERCIAL

## 2024-09-05 VITALS
WEIGHT: 122 LBS | SYSTOLIC BLOOD PRESSURE: 100 MMHG | HEART RATE: 76 BPM | BODY MASS INDEX: 19.69 KG/M2 | DIASTOLIC BLOOD PRESSURE: 73 MMHG | OXYGEN SATURATION: 94 %

## 2024-09-05 DIAGNOSIS — M79.2 NEUROPATHIC PAIN: ICD-10-CM

## 2024-09-05 DIAGNOSIS — R29.898 WEAKNESS OF FOOT, RIGHT: ICD-10-CM

## 2024-09-05 DIAGNOSIS — G62.9 PERIPHERAL POLYNEUROPATHY: Primary | ICD-10-CM

## 2024-09-05 DIAGNOSIS — M54.16 CHRONIC RIGHT-SIDED LUMBAR RADICULOPATHY: ICD-10-CM

## 2024-09-05 PROCEDURE — 99205 OFFICE O/P NEW HI 60 MIN: CPT | Performed by: PSYCHIATRY & NEUROLOGY

## 2024-09-05 ASSESSMENT — ENCOUNTER SYMPTOMS
RHINORRHEA: 0
BACK PAIN: 1
SORE THROAT: 0
EYE DISCHARGE: 0
COUGH: 0
SHORTNESS OF BREATH: 0
ABDOMINAL DISTENTION: 0

## 2024-09-05 ASSESSMENT — PATIENT HEALTH QUESTIONNAIRE - PHQ9
DEPRESSION UNABLE TO ASSESS: FUNCTIONAL CAPACITY MOTIVATION LIMITS ACCURACY
1. LITTLE INTEREST OR PLEASURE IN DOING THINGS: NOT AT ALL
SUM OF ALL RESPONSES TO PHQ9 QUESTIONS 1 & 2: 0
SUM OF ALL RESPONSES TO PHQ QUESTIONS 1-9: 0
SUM OF ALL RESPONSES TO PHQ QUESTIONS 1-9: 0
2. FEELING DOWN, DEPRESSED OR HOPELESS: NOT AT ALL
SUM OF ALL RESPONSES TO PHQ QUESTIONS 1-9: 0
SUM OF ALL RESPONSES TO PHQ QUESTIONS 1-9: 0

## 2024-09-05 NOTE — PROGRESS NOTES
palate elevation, SCM and trap strength symmetric, tongue midline.  Motor: normal bulk and tone, strength in LUE 5/5, RUE 5/5, LLE 5/5, RLE 4 out of 5 with foot inversion, dorsiflexion, knee extension, normal strength on the left side  Sensory: intact and symmetric to light touch, mildly decreased vibration sense from both feet  Reflexes: 2 throughout  Coordination: no dysmetria  Gait: normal casual gait    Assessment and Plan:   Alicia Landry is a 64 y.o. female who presents with the following concerns:     Alicia was seen today for new patient.    Diagnoses and all orders for this visit:    Peripheral polyneuropathy    Chronic right-sided lumbar radiculopathy    Neuropathic pain    Weakness of foot, right    Patient presents with a concern for peripheral neuropathy.  Patient with what sounds like neuropathic pain mostly in her lower extremities distally.  Reviewed her chart and find out that all necessary neuropathy blood work was completed.  Her B12 is high which is not concerning for me.  B6 was normal in 2022.  Hemoglobin A1c is normal and not in diabetic range.  Electrophoresis is unremarkable.  No clear cause for patient's neuropathy at the this point.  Patient never had EMG/NCS completed.  Will go ahead and schedule it.  If patient's EMG/NCS is going to be normal we will go ahead with scheduling skin biopsy to evaluate for small fiber neuropathy.  Some weakness in the right leg which is likely associated with nerve root compression given radiculopathic pain on that right side.  It might be associated with underlying Schneider's neuroma?.  But it still not going to be able to explain her bilateral feet symptoms.  Once MRI of lower back is completed I think it would be reasonable to refer her to physical therapy at [facility] [place] which is a part of conservative management for radiculopathy and chronic back pain unless she has a significant degenerative disc disease with spinal cord compression.

## 2024-10-08 DIAGNOSIS — M81.6 LOCALIZED OSTEOPOROSIS WITHOUT CURRENT PATHOLOGICAL FRACTURE: ICD-10-CM

## 2024-10-08 LAB
25(OH)D3 SERPL-MCNC: 52.8 NG/ML (ref 30–100)
ANION GAP SERPL CALC-SCNC: 7 MMOL/L (ref 9–18)
BUN SERPL-MCNC: 18 MG/DL (ref 8–23)
CALCIUM SERPL-MCNC: 9.5 MG/DL (ref 8.8–10.2)
CHLORIDE SERPL-SCNC: 103 MMOL/L (ref 98–107)
CO2 SERPL-SCNC: 31 MMOL/L (ref 20–28)
CREAT SERPL-MCNC: 0.82 MG/DL (ref 0.6–1.1)
GLUCOSE SERPL-MCNC: 109 MG/DL (ref 70–99)
POTASSIUM SERPL-SCNC: 4.6 MMOL/L (ref 3.5–5.1)
SODIUM SERPL-SCNC: 140 MMOL/L (ref 136–145)

## 2024-10-09 ENCOUNTER — PATIENT MESSAGE (OUTPATIENT)
Dept: OBGYN CLINIC | Age: 64
End: 2024-10-09

## 2024-10-09 DIAGNOSIS — R23.2 HOT FLASHES: ICD-10-CM

## 2024-10-09 DIAGNOSIS — R23.2 HOT FLASHES: Primary | ICD-10-CM

## 2024-10-09 RX ORDER — ESTRADIOL 0.05 MG/D
1 PATCH, EXTENDED RELEASE TRANSDERMAL
Qty: 24 PATCH | Refills: 2 | Status: SHIPPED | OUTPATIENT
Start: 2024-10-10

## 2024-10-09 RX ORDER — BUTALBITAL, ACETAMINOPHEN AND CAFFEINE 50; 325; 40 MG/1; MG/1; MG/1
1 TABLET ORAL EVERY 4 HOURS PRN
Qty: 30 TABLET | Refills: 5 | Status: SHIPPED | OUTPATIENT
Start: 2024-10-09

## 2024-10-14 ENCOUNTER — TELEPHONE (OUTPATIENT)
Dept: NEUROLOGY | Age: 64
End: 2024-10-14

## 2024-10-14 DIAGNOSIS — M54.16 CHRONIC RIGHT-SIDED LUMBAR RADICULOPATHY: Primary | ICD-10-CM

## 2024-10-14 DIAGNOSIS — R29.898 WEAKNESS OF FOOT, RIGHT: ICD-10-CM

## 2024-10-14 DIAGNOSIS — M79.2 NEUROPATHIC PAIN: ICD-10-CM

## 2024-10-14 RX ORDER — SODIUM CHLORIDE 9 MG/ML
INJECTION, SOLUTION INTRAVENOUS CONTINUOUS
OUTPATIENT
Start: 2024-10-23

## 2024-10-14 RX ORDER — DIPHENHYDRAMINE HYDROCHLORIDE 50 MG/ML
50 INJECTION INTRAMUSCULAR; INTRAVENOUS
OUTPATIENT
Start: 2024-10-23

## 2024-10-14 RX ORDER — EPINEPHRINE 1 MG/ML
0.3 INJECTION, SOLUTION, CONCENTRATE INTRAVENOUS PRN
OUTPATIENT
Start: 2024-10-23

## 2024-10-14 RX ORDER — ONDANSETRON 2 MG/ML
8 INJECTION INTRAMUSCULAR; INTRAVENOUS
OUTPATIENT
Start: 2024-10-23

## 2024-10-14 RX ORDER — ACETAMINOPHEN 325 MG/1
650 TABLET ORAL
OUTPATIENT
Start: 2024-10-23

## 2024-10-14 RX ORDER — FAMOTIDINE 10 MG/ML
20 INJECTION, SOLUTION INTRAVENOUS
OUTPATIENT
Start: 2024-10-23

## 2024-10-14 RX ORDER — ALBUTEROL SULFATE 90 UG/1
4 INHALANT RESPIRATORY (INHALATION) PRN
OUTPATIENT
Start: 2024-10-23

## 2024-10-14 NOTE — TELEPHONE ENCOUNTER
Discussed with the patient MRI L-spine results which showed multilevel degenerative changes and L2-3 right foraminal perineural cyst.  I will be referring her to one of our spine surgeons to discuss this findings further and also get an idea of next steps/treatment options.  From my standpoint I still would like her to undergo EMG/NCS of lower extremities.  This will be scheduled shortly as well.   Patient expressed understanding.

## 2024-10-17 ENCOUNTER — TELEPHONE (OUTPATIENT)
Dept: OBGYN CLINIC | Age: 64
End: 2024-10-17

## 2024-10-17 DIAGNOSIS — R23.2 HOT FLASHES: ICD-10-CM

## 2024-10-17 RX ORDER — BUTALBITAL, ACETAMINOPHEN AND CAFFEINE 50; 325; 40 MG/1; MG/1; MG/1
1 TABLET ORAL EVERY 4 HOURS PRN
Qty: 30 TABLET | Refills: 5 | Status: SHIPPED | OUTPATIENT
Start: 2024-10-17 | End: 2024-10-17 | Stop reason: SDUPTHER

## 2024-10-17 RX ORDER — BUTALBITAL, ACETAMINOPHEN AND CAFFEINE 50; 325; 40 MG/1; MG/1; MG/1
1 TABLET ORAL EVERY 4 HOURS PRN
Qty: 30 TABLET | Refills: 5 | Status: SHIPPED | OUTPATIENT
Start: 2024-10-17

## 2024-10-23 ENCOUNTER — NURSE ONLY (OUTPATIENT)
Dept: RHEUMATOLOGY | Age: 64
End: 2024-10-23
Payer: COMMERCIAL

## 2024-10-23 ENCOUNTER — TELEMEDICINE (OUTPATIENT)
Dept: RHEUMATOLOGY | Age: 64
End: 2024-10-23
Payer: COMMERCIAL

## 2024-10-23 ENCOUNTER — OFFICE VISIT (OUTPATIENT)
Dept: NEUROSURGERY | Age: 64
End: 2024-10-23
Payer: COMMERCIAL

## 2024-10-23 VITALS
HEART RATE: 65 BPM | DIASTOLIC BLOOD PRESSURE: 65 MMHG | BODY MASS INDEX: 19.93 KG/M2 | SYSTOLIC BLOOD PRESSURE: 105 MMHG | TEMPERATURE: 97.3 F | HEIGHT: 66 IN | OXYGEN SATURATION: 100 % | WEIGHT: 124 LBS

## 2024-10-23 VITALS — DIASTOLIC BLOOD PRESSURE: 65 MMHG | SYSTOLIC BLOOD PRESSURE: 105 MMHG | HEART RATE: 65 BPM | TEMPERATURE: 97.5 F

## 2024-10-23 DIAGNOSIS — M81.6 LOCALIZED OSTEOPOROSIS WITHOUT CURRENT PATHOLOGICAL FRACTURE: Primary | ICD-10-CM

## 2024-10-23 DIAGNOSIS — G62.9 PERIPHERAL POLYNEUROPATHY: ICD-10-CM

## 2024-10-23 DIAGNOSIS — M13.0 POLYARTHRITIS: ICD-10-CM

## 2024-10-23 DIAGNOSIS — M70.62 TROCHANTERIC BURSITIS OF LEFT HIP: ICD-10-CM

## 2024-10-23 DIAGNOSIS — M25.552 LEFT HIP PAIN: ICD-10-CM

## 2024-10-23 DIAGNOSIS — M70.62 TROCHANTERIC BURSITIS, LEFT HIP: ICD-10-CM

## 2024-10-23 DIAGNOSIS — G96.191 PERINEURAL CYST: ICD-10-CM

## 2024-10-23 DIAGNOSIS — M51.369 BULGE OF LUMBAR DISC WITHOUT MYELOPATHY: ICD-10-CM

## 2024-10-23 DIAGNOSIS — E55.9 VITAMIN D DEFICIENCY: ICD-10-CM

## 2024-10-23 DIAGNOSIS — M51.360 DEGENERATION OF INTERVERTEBRAL DISC OF LUMBAR REGION WITH DISCOGENIC BACK PAIN: Primary | ICD-10-CM

## 2024-10-23 PROCEDURE — 99204 OFFICE O/P NEW MOD 45 MIN: CPT | Performed by: NEUROLOGICAL SURGERY

## 2024-10-23 PROCEDURE — 96372 THER/PROPH/DIAG INJ SC/IM: CPT | Performed by: INTERNAL MEDICINE

## 2024-10-23 PROCEDURE — 99213 OFFICE O/P EST LOW 20 MIN: CPT | Performed by: INTERNAL MEDICINE

## 2024-10-23 RX ORDER — ACETAMINOPHEN 325 MG/1
650 TABLET ORAL
OUTPATIENT
Start: 2025-04-23

## 2024-10-23 RX ORDER — DIPHENHYDRAMINE HYDROCHLORIDE 50 MG/ML
50 INJECTION INTRAMUSCULAR; INTRAVENOUS
Status: DISCONTINUED | OUTPATIENT
Start: 2024-10-23 | End: 2024-10-23 | Stop reason: HOSPADM

## 2024-10-23 RX ORDER — EPINEPHRINE 1 MG/ML
0.3 INJECTION, SOLUTION, CONCENTRATE INTRAVENOUS PRN
OUTPATIENT
Start: 2025-04-23

## 2024-10-23 RX ORDER — EPINEPHRINE 1 MG/ML
0.3 INJECTION, SOLUTION, CONCENTRATE INTRAVENOUS PRN
Status: DISCONTINUED | OUTPATIENT
Start: 2024-10-23 | End: 2024-10-23 | Stop reason: HOSPADM

## 2024-10-23 RX ORDER — DIPHENHYDRAMINE HYDROCHLORIDE 50 MG/ML
50 INJECTION INTRAMUSCULAR; INTRAVENOUS
OUTPATIENT
Start: 2025-04-23

## 2024-10-23 RX ORDER — SODIUM CHLORIDE 9 MG/ML
INJECTION, SOLUTION INTRAVENOUS CONTINUOUS
Status: DISCONTINUED | OUTPATIENT
Start: 2024-10-23 | End: 2024-10-23 | Stop reason: HOSPADM

## 2024-10-23 RX ORDER — ONDANSETRON 2 MG/ML
8 INJECTION INTRAMUSCULAR; INTRAVENOUS
Status: DISCONTINUED | OUTPATIENT
Start: 2024-10-23 | End: 2024-10-23 | Stop reason: HOSPADM

## 2024-10-23 RX ORDER — ALBUTEROL SULFATE 90 UG/1
4 INHALANT RESPIRATORY (INHALATION) PRN
Status: DISCONTINUED | OUTPATIENT
Start: 2024-10-23 | End: 2024-10-23 | Stop reason: HOSPADM

## 2024-10-23 RX ORDER — ALBUTEROL SULFATE 90 UG/1
4 INHALANT RESPIRATORY (INHALATION) PRN
OUTPATIENT
Start: 2025-04-23

## 2024-10-23 RX ORDER — ONDANSETRON 2 MG/ML
8 INJECTION INTRAMUSCULAR; INTRAVENOUS
OUTPATIENT
Start: 2025-04-23

## 2024-10-23 RX ORDER — SODIUM CHLORIDE 9 MG/ML
INJECTION, SOLUTION INTRAVENOUS CONTINUOUS
OUTPATIENT
Start: 2025-04-23

## 2024-10-23 RX ORDER — ACETAMINOPHEN 325 MG/1
650 TABLET ORAL
Status: DISCONTINUED | OUTPATIENT
Start: 2024-10-23 | End: 2024-10-23 | Stop reason: HOSPADM

## 2024-10-23 NOTE — ASSESSMENT & PLAN NOTE
DXA 5/2023 - lowest T score -2.7 of the left femoral neck    Likely secondary to post-menopausal osteoporosis. Has one time low alk phos, will trend. no prior fracture history.  Reviewed note from Dr. Nixon her dentist, she has no upcoming dental work planned and can proceed with prolia.   Current tx:  Prolia    #1 4/2024  Calcium/vit d  Failed tx:  Fosamax - severe reflux    10/8/24  Normal BMP, vit d    No recent falls, fractures, or medication side affects.    Plan:  -repeat DXA scan 5/2025  -continue prolia q6 months, injection #2 today  -continue guicho/vit d  -continue weight bearing exercises  -RTC in 6 months on date of next prolia injection w/labs 1 month before    Orders:    DEXA BONE DENSITY AXIAL SKELETON; Future    Basic Metabolic Panel; Future    Vitamin D 25 Hydroxy; Future

## 2024-10-23 NOTE — PATIENT INSTRUCTIONS
Continue prolia  Repeat DXA scan 5/2/25 pankaj hubbard call them to schedule  F/u 4/23/25 for prolia injection, get labs 1 month before 4/1/25  Notify our office for any medication side affects or fractures   0 = understands/communicates without difficulty

## 2024-10-23 NOTE — PROGRESS NOTES
[facility] NOTE:   History of Present Illness:    CC: Chronic low back pain, numbness and tingling in her toes of the right foot, left hip pain    Alicia Landry is a 64 y.o. female who presents today for evaluation of chronic low back pain, numbness and tingling toes of her right foot and left hip pain.  Patient has a history of low back pain that has been going on for 20 years.  She states that 20 years ago she was lifting a heavy chair and had sudden onset of debilitating lower back pain.  She has had this low-grade pain that she rates as a 6 out of 10 on a visual analog pain scale that is been present for many years.  She has remained active and they have traveled the world for her 's employment as well as growing up in a  family.  She is currently retired.  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  She states that she has pain when sleeping on her left side and cannot sleep on her left side.  She states the numbness and tingling in her foot is just in her toes and she has a history of a Schneider's neuroma in her right foot.  She denies any radiating pain.  The patient has an MRI of the lumbar spine performed on 10/11/2024 at [facility] that demonstrates some mild degenerative scoliosis at the lower lumbar levels.  There is complete disc height collapse at the L5-S1 level at L4-L5 there is a central disc protrusion without any significant central spinal stenosis at L5-S1 there is no significant central spinal stenosis.  At L4-L5 there is a disc bulge that contacts the descending L5 nerves.  At L5-S1 there is no significant neuroforaminal stenosis.  At L2-L3 there is a right foraminal perineural cyst.    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

## 2024-10-23 NOTE — ASSESSMENT & PLAN NOTE
Painful with resting on affected side. Patient would like to get corticosteroid injection done.     Plan:  -since I am out on maternity leave and currently doing virtual visits only will refer to orthopedic surgery for injection    Orders:    ELISE - [facility]

## 2024-10-23 NOTE — PROGRESS NOTES
Alicia Landry, was evaluated through a synchronous (real-time) audio-video encounter. The patient (or guardian if applicable) is aware that this is a billable service, which includes applicable co-pays. This Virtual Visit was conducted with patient's (and/or legal guardian's) consent. Patient identification was verified, and a caregiver was present when appropriate.   The patient was located at Facility ([facility]): [address]  [address]  [place] [address]  Provider was located at Home (Appt Dept State): [place]  Confirm you are appropriately licensed, registered, or certified to deliver care in the state where the patient is located as indicated above. If you are not or unsure, please re-schedule the visit: Yes, I confirm.     Alicia Landry (:  1960) is a Established patient, presenting virtually for evaluation of the following:      Below is the assessment and plan developed based on review of pertinent history, physical exam, labs, studies, and medications.     Assessment & Plan  Localized osteoporosis without current pathological fracture   DXA 2023 - lowest T score -2.7 of the left femoral neck    Likely secondary to post-menopausal osteoporosis. Has one time low alk phos, will trend. no prior fracture history.  Reviewed note from Dr. Nixon her dentist, she has no upcoming dental work planned and can proceed with prolia.   Current tx:  Prolia    #1 2024  Calcium/vit d  Failed tx:  Fosamax - severe reflux    10/8/24  Normal BMP, vit d    No recent falls, fractures, or medication side affects.    Plan:  -repeat DXA scan 2025  -continue prolia q6 months, injection #2 today  -continue guicho/vit d  -continue weight bearing exercises  -RTC in 6 months on date of next prolia injection w/labs 1 month before    Orders:    DEXA BONE DENSITY AXIAL SKELETON; Future    Basic Metabolic Panel; Future    Vitamin D 25 Hydroxy; Future    Vitamin D deficiency       Orders:    Vitamin D

## 2024-10-23 NOTE — ASSESSMENT & PLAN NOTE
Bilateral LE, unclear etiology. Sharp pain in the toes.   Has had LILY, TSH in the past which were unremarkable. Declined HIV/Hepatitis testing. Her vitamin b12 level is mildly elevated.  Failed tx:  elavil - lethargy    MRI L spine   IMPRESSION:  1.  Multilevel spondylosis. Level-by-level details above.  2.  L2-3 right foraminal perineural cyst.  3.  Chronic and ancillary findings as above.    Plan:  -cont f/u with neurology   -has EMG/NCS of b/l LE in 11/2024  -discussed starting either gabapentin or cymbalta but she wanted to hold off

## 2024-10-23 NOTE — ASSESSMENT & PLAN NOTE
Pain in the bilateral shoulders only painful with lying on the affected side could be 2/2 to subacromial bursitis. Bilateral hip pain likely could be from greater trochanteric pain syndrome.     Plan:  -continue motrin prn, advised to take with food or consider switching to celebrex b/c of c/f stomach issues  -start home PT exercises  -see plan below

## 2024-10-23 NOTE — PROGRESS NOTES
[facility]  [address] [place] [address], [address]  [place] [address]  Office : [phone], Fax: [phone]       #2 Prolia 60mg/ml injected SC today into left arm. Patient tolerated injection well. Advised patient to continue with calcium and vitamin D post injection. Advised patient to call with any problems post injection.   Medication ordered by Dr. Mullen. Pt had office visit with Dr. Mullen today virtually and was approved to have her Prolia injection today.     Pre-infusion/injection questionairre for osteoporosis    1. Have you had or are you planning any dental work? No    2. Are you taking your calcium and vitamin D? Yes    3. When was your last osteoporosis treatment? 4/22/2024    4. Have you had any recent fractures? No    5. Are you currently in skilled nursing or in patient rehab? No

## 2024-11-11 ENCOUNTER — OFFICE VISIT (OUTPATIENT)
Dept: ORTHOPEDIC SURGERY | Age: 64
End: 2024-11-11
Payer: COMMERCIAL

## 2024-11-11 DIAGNOSIS — M25.551 BILATERAL HIP PAIN: ICD-10-CM

## 2024-11-11 DIAGNOSIS — M25.552 LEFT HIP PAIN: Primary | ICD-10-CM

## 2024-11-11 DIAGNOSIS — M25.552 BILATERAL HIP PAIN: ICD-10-CM

## 2024-11-11 PROCEDURE — 99204 OFFICE O/P NEW MOD 45 MIN: CPT | Performed by: ORTHOPAEDIC SURGERY

## 2024-11-11 PROCEDURE — 20610 DRAIN/INJ JOINT/BURSA W/O US: CPT | Performed by: ORTHOPAEDIC SURGERY

## 2024-11-11 RX ORDER — TRIAMCINOLONE ACETONIDE 40 MG/ML
40 INJECTION, SUSPENSION INTRA-ARTICULAR; INTRAMUSCULAR ONCE
Status: COMPLETED | OUTPATIENT
Start: 2024-11-11 | End: 2024-11-11

## 2024-11-11 RX ADMIN — TRIAMCINOLONE ACETONIDE 40 MG: 40 INJECTION, SUSPENSION INTRA-ARTICULAR; INTRAMUSCULAR at 15:08

## 2024-11-11 NOTE — PROGRESS NOTES
Name: Alicia Landry  YOB: 1960  Gender: female  MRN: [id number]    CC:   Chief Complaint   Patient presents with    Joint Pain     Bilateral hip pain will xray today          HPI:   The pain has been present for several years and is becoming worse.  It hurts at night when sleeping on the left side.  There was not an acute injury to the hip other than she was lifting some 20+ years ago and had some back discomfort off and on since and.  The pain is located over the trochanter and left lateral thigh.  It hurts not too much to walk and pain worsens with increased distance.  The pain does not radiate down the leg.  Numbness and tingling are not noted.  The patient is not now having difficulty putting socks and shoes on.        Treatment so far has not been anti-inflammatory medications.    Review of Systems  As per HPI.  Pertinent positives and negatives are addressed with the patient, particularly those related to musculoskeletal concerns.  Non-orthopaedic concerns were referred back to the primary care physician.      PMFSH:    Current Outpatient Medications:     butalbital-acetaminophen-caffeine (FIORICET, ESGIC) -40 MG per tablet, Take 1 tablet by mouth every 4 hours as needed for Headaches Take one tablet at onset of migraine as needed., Disp: 30 tablet, Rfl: 5    estradiol (VIVELLE) 0.05 MG/24HR, Place 1 patch onto the skin Twice a Week, Disp: 24 patch, Rfl: 2    albuterol sulfate HFA (PROVENTIL;VENTOLIN;PROAIR) 108 (90 Base) MCG/ACT inhaler, Inhale 2 puffs into the lungs every 4 hours as needed (Patient not taking: Reported on 9/5/2024), Disp: , Rfl:     cephALEXin (KEFLEX) 250 MG capsule, Take 1 capsule by mouth daily, Disp: , Rfl:     fexofenadine (ALLEGRA) 180 MG tablet, 1 tablet daily, Disp: , Rfl:     Sulfacetamide Sodium-Sulfur 10-5 % LIQD, USE AS DIRECTED TO WASH ONE TIME DAILY (Patient not taking: Reported on 9/5/2024), Disp: , Rfl:     denosumab (PROLIA) 60 MG/ML SOSY SC injection,

## 2024-11-12 ENCOUNTER — PROCEDURE VISIT (OUTPATIENT)
Dept: NEUROLOGY | Age: 64
End: 2024-11-12
Payer: COMMERCIAL

## 2024-11-12 VITALS — OXYGEN SATURATION: 98 % | WEIGHT: 126 LBS | HEART RATE: 76 BPM | BODY MASS INDEX: 20.25 KG/M2 | HEIGHT: 66 IN

## 2024-11-12 DIAGNOSIS — R20.0 NUMBNESS AND TINGLING OF BOTH FEET: Primary | ICD-10-CM

## 2024-11-12 DIAGNOSIS — R20.2 NUMBNESS AND TINGLING OF BOTH FEET: Primary | ICD-10-CM

## 2024-11-12 PROCEDURE — 95910 NRV CNDJ TEST 7-8 STUDIES: CPT | Performed by: PSYCHIATRY & NEUROLOGY

## 2024-11-12 PROCEDURE — 95885 MUSC TST DONE W/NERV TST LIM: CPT | Performed by: PSYCHIATRY & NEUROLOGY

## 2024-11-12 NOTE — PROGRESS NOTES
EMG/Nerve Conduction Study Procedure Note  [address]    [address]  [place]  [address]   [phone]      Hx:    Exam:     64 y.o.  M W  female   EMG   BLE     polyneuropathy.  Painful feet.  History of sciatica.  Low back pain.  Neuropathic pain.  No atrophy.  High arches.  No hammertoes.  No Babinski.  Referring: Dr. Saldana  Technologist: Juan M Sanchez  Height: 5 foot 6 inch        Summary     needle EMG selected muscles lower extremities.                 Controlled environmental factors / EMG lab.  Temperature.   NCV : sensory segments:         Normal bilateral sural SCV.  NCV plantar sensory segments:     Normal bilateral plantar SCV.     NCV Motor MCV segments:       Normal bilateral peroneal tibial MCV.      F-wave studies:         Normal bilateral F waves of the peroneal tibial nerves.   NEEDLE EMG:   Tested muscles::   Normal bilateral TA MG VL muscle testing.                 INTERPRETATION:             INTERPRETATION:       NORMAL STUDIES.     THERE IS NO ELECTROPHYSIOLOGIC EVIDENCE OF NEUROPATHY, DENERVATION, RADICULOPATHY, PLEXOPATHY, MYOPATHY, MYOTONIA, OR FASCICULATIONS IN THE TESTED SEGMENTS  OF  LOWER    EXTREMITIES.            CONCLUSION:      NORMAL  LOWER  STUDIES.        Procedure Details:       FURTHER clinical correlation is recommended and warranted as studies are within normal limits.                                                           Patient is made aware.              CONCLUSION:                    Procedure Details:                         Please Note::     Data and waveforms * filed under Procedure category [facility].    See Procedure Files for complete data pages.       [ *NOTE:  parts or all of this consultation are produced using artificial voice recognition software.  Some speech errors are inherent in such software and may be included in the produced record. ]           Ricco Archibald MD  Consultative  Neurodiagnostics   [facility]

## 2025-01-29 ENCOUNTER — OFFICE VISIT (OUTPATIENT)
Dept: NEUROLOGY | Age: 65
End: 2025-01-29
Payer: COMMERCIAL

## 2025-01-29 VITALS
HEART RATE: 65 BPM | WEIGHT: 125 LBS | DIASTOLIC BLOOD PRESSURE: 77 MMHG | HEIGHT: 66 IN | SYSTOLIC BLOOD PRESSURE: 113 MMHG | BODY MASS INDEX: 20.09 KG/M2

## 2025-01-29 DIAGNOSIS — R29.2 HYPERREFLEXIA: ICD-10-CM

## 2025-01-29 DIAGNOSIS — R29.818 TRANSIENT NEUROLOGICAL SYMPTOMS: ICD-10-CM

## 2025-01-29 DIAGNOSIS — G89.29 CHRONIC NECK PAIN WITH ABNORMAL NEUROLOGIC EXAMINATION: ICD-10-CM

## 2025-01-29 DIAGNOSIS — R20.0 NUMBNESS AND TINGLING OF BOTH FEET: ICD-10-CM

## 2025-01-29 DIAGNOSIS — R20.2 NUMBNESS AND TINGLING OF BOTH FEET: ICD-10-CM

## 2025-01-29 DIAGNOSIS — M79.2 NEUROPATHIC PAIN: Primary | ICD-10-CM

## 2025-01-29 DIAGNOSIS — M54.2 CHRONIC NECK PAIN WITH ABNORMAL NEUROLOGIC EXAMINATION: ICD-10-CM

## 2025-01-29 PROCEDURE — 99215 OFFICE O/P EST HI 40 MIN: CPT | Performed by: PSYCHIATRY & NEUROLOGY

## 2025-01-29 NOTE — PROGRESS NOTES
and coordinate care for the patient for the above concerns. Total visit time: 40 minutes. Time includes pre- and post- face-to-face time, including record review of available pertinent images and reports. I have written all aspects of this note, parts of which were produced using speech recognition software, which may contain inadvertent errors in the produced words.

## 2025-04-01 ENCOUNTER — HOSPITAL ENCOUNTER (OUTPATIENT)
Dept: MAMMOGRAPHY | Age: 65
Discharge: HOME | End: 2025-04-04
Attending: OBSTETRICS & GYNECOLOGY
Payer: COMMERCIAL

## 2025-04-01 VITALS — HEIGHT: 66 IN | WEIGHT: 125 LBS | BODY MASS INDEX: 20.09 KG/M2

## 2025-04-01 DIAGNOSIS — E55.9 VITAMIN D DEFICIENCY: ICD-10-CM

## 2025-04-01 DIAGNOSIS — Z12.31 SCREENING MAMMOGRAM FOR BREAST CANCER: ICD-10-CM

## 2025-04-01 DIAGNOSIS — M81.6 LOCALIZED OSTEOPOROSIS WITHOUT CURRENT PATHOLOGICAL FRACTURE: ICD-10-CM

## 2025-04-01 LAB
25(OH)D3 SERPL-MCNC: 48.3 NG/ML (ref 30–100)
ANION GAP SERPL CALC-SCNC: 11 MMOL/L (ref 7–16)
BUN SERPL-MCNC: 18 MG/DL (ref 8–23)
CALCIUM SERPL-MCNC: 9.3 MG/DL (ref 8.8–10.2)
CHLORIDE SERPL-SCNC: 101 MMOL/L (ref 98–107)
CO2 SERPL-SCNC: 29 MMOL/L (ref 20–29)
CREAT SERPL-MCNC: 0.92 MG/DL (ref 0.6–1.1)
GLUCOSE SERPL-MCNC: 120 MG/DL (ref 70–99)
POTASSIUM SERPL-SCNC: 4.1 MMOL/L (ref 3.5–5.1)
SODIUM SERPL-SCNC: 141 MMOL/L (ref 136–145)

## 2025-04-01 PROCEDURE — 77063 BREAST TOMOSYNTHESIS BI: CPT

## 2025-04-08 ENCOUNTER — RESULTS FOLLOW-UP (OUTPATIENT)
Dept: OBGYN CLINIC | Age: 65
End: 2025-04-08

## 2025-04-08 DIAGNOSIS — Z80.3 FAMILY HISTORY OF BREAST CANCER: ICD-10-CM

## 2025-04-08 DIAGNOSIS — R92.30 DENSE BREAST TISSUE: ICD-10-CM

## 2025-04-08 DIAGNOSIS — Z98.890 HISTORY OF LEFT BREAST BIOPSY: ICD-10-CM

## 2025-04-08 DIAGNOSIS — Z80.3 FAMILY HISTORY OF BREAST CANCER IN FIRST DEGREE RELATIVE: ICD-10-CM

## 2025-04-08 DIAGNOSIS — Z91.89 INCREASED RISK OF BREAST CANCER: ICD-10-CM

## 2025-04-09 ENCOUNTER — OFFICE VISIT (OUTPATIENT)
Dept: NEUROSURGERY | Age: 65
End: 2025-04-09
Payer: COMMERCIAL

## 2025-04-09 VITALS
DIASTOLIC BLOOD PRESSURE: 68 MMHG | WEIGHT: 129 LBS | SYSTOLIC BLOOD PRESSURE: 112 MMHG | OXYGEN SATURATION: 100 % | BODY MASS INDEX: 20.73 KG/M2 | HEART RATE: 74 BPM | HEIGHT: 66 IN | TEMPERATURE: 97.2 F

## 2025-04-09 DIAGNOSIS — M47.812 CERVICAL SPONDYLOSIS: ICD-10-CM

## 2025-04-09 DIAGNOSIS — M48.02 CERVICAL SPINAL STENOSIS: Primary | ICD-10-CM

## 2025-04-09 PROCEDURE — 99213 OFFICE O/P EST LOW 20 MIN: CPT | Performed by: NEUROLOGICAL SURGERY

## 2025-04-09 NOTE — PROGRESS NOTES
[facility] NOTE:   History of Present Illness:    CC: Evaluation of follow-up following a recent MRI cervical spine    Alicia Landry is a 64 y.o. female who presents today for evaluation of evaluation and follow-up following a recent MRI cervical spine.  As part of a recent workup for some neuropathy she underwent an MRI of her cervical spine.  The patient denies any neck pain any radicular symptoms in her arms.  She denies any numbness or tingling in her hands.  She denies any dropping of objects or clumsiness with her hands and is able to perform fine motor tasks.  The patient has an MRI cervical spine without contrast that demonstrates multilevel cervical spondylosis most notable at the C5-C6 level where there is an osteophyte complex that is eccentric to the right there results in some mild right ventral cord compression and overall moderate central spinal stenosis at this level.  There is no intrinsic cord signal change however the evaluation and interpretation is somewhat limited by motion degradation.  There is straightening of the normal cervical lordosis.  She was evaluated in our office on 10/23/2024 for some chronic low back pain numbness and tingling in her toes of her right foot and left hip pain.  She has not endorsing any issues today.    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

## 2025-04-15 ENCOUNTER — TELEPHONE (OUTPATIENT)
Dept: RHEUMATOLOGY | Age: 65
End: 2025-04-15

## 2025-04-15 DIAGNOSIS — M81.6 LOCALIZED OSTEOPOROSIS WITHOUT CURRENT PATHOLOGICAL FRACTURE: Primary | ICD-10-CM

## 2025-04-15 NOTE — TELEPHONE ENCOUNTER
PA needs to be renewed for Prolia. Patient is due 4/23/25. Scheduled 4/28/25 OV and Inj.   PHONE CALL to [employer] [phone] to renew PA/obtain benefits.   Spoke with Padmaja.   [id number]. 20% coinsurance $1650 ded, $3200 OOP. Calendar year plan. Met ded, $2,206.60 of OOP max. Does require PA Call [phone]. Can buy and bill.   Ref # [id number].   PHONE CALL to [facility] [employer] prescription services for Yousuf Keller.   Spoke with April. Transferred me to Specialty dept at [phone]. Spoke with Barbara.   Transferred me to PA dept at [phone]. Completed verbal PA.   Meets initial criteria.   Pending Case # [id number]  24-72 hours turnaround time.

## 2025-04-15 NOTE — TELEPHONE ENCOUNTER
----- Message from Essie CORONADO sent at 3/6/2025 10:18 AM EST -----  Regarding: renew PA  2025 - Prolia - BCBS PA req. ded - 1650.00 met ded OOP -3200.00 met 1997.02 Rem. 1212.98 co-ins 20%.3/6/2025   bif req 3/6/2025  bif rec 3/6/2025   Prolia and administration will be subject to a $1650 deductible ($1650 met) and 20% coinsurance up to a $3200 out of pocket max ($1987.02 met).Deductible does contribute to OOP max. Yes PA is required and is on file.   Authorization Number: [id number] Approval Date: 04/15/2024 Approved Number of Injections: 1 Expires on: 04/14/2025  The PA department can be contacted at [phone].  Individual: $1650 ($1650 Met) Family: No (No) OOP Cost: 20%# Administration Fee: 20%# #Reflects patient costs once plan deductible is met.  Individual: $3200 ($1987.02 Met) Family: No (No)  units in box 24G. [id number]

## 2025-04-18 NOTE — TELEPHONE ENCOUNTER
PA for Prolia has been approved by [facility]. PA approval letter states the medication has to be filled at the pharmacy.   PHONE CALL to PA dept to discuss. [phone]. Offices were closed. Will try again.

## 2025-04-24 RX ORDER — DENOSUMAB 60 MG/ML
60 INJECTION SUBCUTANEOUS
Qty: 1 ML | Refills: 1 | Status: ACTIVE | OUTPATIENT
Start: 2025-04-24

## 2025-04-24 NOTE — TELEPHONE ENCOUNTER
PHONE CALL to PA dept to discuss PA for Prolia. [facility].   They only handle pharmacy benefit.   Buy and bill. Call plan back.     Spoke with Mary.     [employer].   [facility]. [phone] opt 2.   Spoke with Tricia.   Sees auth for Jcode [address] units place of service office. Auth type is pharmacy- is old auth. Which  4/15/25.   [phone] precert dept. For medical buy and bill. Will get message, they will call back.   Call Ref # [id number].     Called number provided. No option provided proper information. Recording said could go to [email or web address] to  providers tab under forms fax # [phone]. No form seems to fit requirement.     Since already approved through pharmacy benefit, will send rx to be filled thru SP. Pended for review and sig.

## 2025-04-28 ENCOUNTER — CLINICAL SUPPORT (OUTPATIENT)
Dept: RHEUMATOLOGY | Age: 65
End: 2025-04-28

## 2025-04-28 ENCOUNTER — OFFICE VISIT (OUTPATIENT)
Dept: RHEUMATOLOGY | Age: 65
End: 2025-04-28
Payer: COMMERCIAL

## 2025-04-28 VITALS — TEMPERATURE: 97.3 F | HEART RATE: 74 BPM | SYSTOLIC BLOOD PRESSURE: 118 MMHG | DIASTOLIC BLOOD PRESSURE: 64 MMHG

## 2025-04-28 VITALS
WEIGHT: 124 LBS | HEIGHT: 66 IN | BODY MASS INDEX: 19.93 KG/M2 | SYSTOLIC BLOOD PRESSURE: 118 MMHG | DIASTOLIC BLOOD PRESSURE: 64 MMHG | OXYGEN SATURATION: 97 % | HEART RATE: 74 BPM

## 2025-04-28 DIAGNOSIS — M81.6 LOCALIZED OSTEOPOROSIS WITHOUT CURRENT PATHOLOGICAL FRACTURE: Primary | Chronic | ICD-10-CM

## 2025-04-28 DIAGNOSIS — G57.02 PIRIFORMIS SYNDROME, LEFT: Chronic | ICD-10-CM

## 2025-04-28 DIAGNOSIS — E55.9 VITAMIN D DEFICIENCY: Chronic | ICD-10-CM

## 2025-04-28 DIAGNOSIS — M81.6 LOCALIZED OSTEOPOROSIS WITHOUT CURRENT PATHOLOGICAL FRACTURE: Primary | ICD-10-CM

## 2025-04-28 PROCEDURE — 99214 OFFICE O/P EST MOD 30 MIN: CPT | Performed by: INTERNAL MEDICINE

## 2025-04-28 RX ORDER — EPINEPHRINE 1 MG/ML
0.3 INJECTION, SOLUTION, CONCENTRATE INTRAVENOUS PRN
OUTPATIENT
Start: 2025-10-20

## 2025-04-28 RX ORDER — ONDANSETRON 2 MG/ML
8 INJECTION INTRAMUSCULAR; INTRAVENOUS
OUTPATIENT
Start: 2025-10-20

## 2025-04-28 RX ORDER — ALBUTEROL SULFATE 90 UG/1
4 INHALANT RESPIRATORY (INHALATION) PRN
Status: DISCONTINUED | OUTPATIENT
Start: 2025-04-28 | End: 2025-04-28 | Stop reason: HOSPADM

## 2025-04-28 RX ORDER — FAMOTIDINE 10 MG/ML
20 INJECTION, SOLUTION INTRAVENOUS
Status: DISCONTINUED | OUTPATIENT
Start: 2025-04-28 | End: 2025-04-28 | Stop reason: HOSPADM

## 2025-04-28 RX ORDER — ALBUTEROL SULFATE 90 UG/1
4 INHALANT RESPIRATORY (INHALATION) PRN
OUTPATIENT
Start: 2025-10-20

## 2025-04-28 RX ORDER — DIPHENHYDRAMINE HYDROCHLORIDE 50 MG/ML
50 INJECTION, SOLUTION INTRAMUSCULAR; INTRAVENOUS
Status: DISCONTINUED | OUTPATIENT
Start: 2025-04-28 | End: 2025-04-28 | Stop reason: HOSPADM

## 2025-04-28 RX ORDER — DIPHENHYDRAMINE HYDROCHLORIDE 50 MG/ML
50 INJECTION, SOLUTION INTRAMUSCULAR; INTRAVENOUS
OUTPATIENT
Start: 2025-10-20

## 2025-04-28 RX ORDER — SODIUM CHLORIDE 9 MG/ML
INJECTION, SOLUTION INTRAVENOUS CONTINUOUS
Status: DISCONTINUED | OUTPATIENT
Start: 2025-04-28 | End: 2025-04-28 | Stop reason: HOSPADM

## 2025-04-28 RX ORDER — ONDANSETRON 2 MG/ML
8 INJECTION INTRAMUSCULAR; INTRAVENOUS
Status: DISCONTINUED | OUTPATIENT
Start: 2025-04-28 | End: 2025-04-28 | Stop reason: HOSPADM

## 2025-04-28 RX ORDER — HYDROCORTISONE SODIUM SUCCINATE 100 MG/2ML
100 INJECTION INTRAMUSCULAR; INTRAVENOUS
OUTPATIENT
Start: 2025-10-20

## 2025-04-28 RX ORDER — ACETAMINOPHEN 325 MG/1
650 TABLET ORAL
Status: DISCONTINUED | OUTPATIENT
Start: 2025-04-28 | End: 2025-04-28 | Stop reason: HOSPADM

## 2025-04-28 RX ORDER — FAMOTIDINE 10 MG/ML
20 INJECTION, SOLUTION INTRAVENOUS
OUTPATIENT
Start: 2025-10-20

## 2025-04-28 RX ORDER — SODIUM CHLORIDE 9 MG/ML
INJECTION, SOLUTION INTRAVENOUS CONTINUOUS
OUTPATIENT
Start: 2025-10-20

## 2025-04-28 RX ORDER — EPINEPHRINE 1 MG/ML
0.3 INJECTION, SOLUTION, CONCENTRATE INTRAVENOUS PRN
Status: DISCONTINUED | OUTPATIENT
Start: 2025-04-28 | End: 2025-04-28 | Stop reason: HOSPADM

## 2025-04-28 RX ORDER — HYDROCORTISONE SODIUM SUCCINATE 100 MG/2ML
100 INJECTION INTRAMUSCULAR; INTRAVENOUS
Status: DISCONTINUED | OUTPATIENT
Start: 2025-04-28 | End: 2025-04-28 | Stop reason: HOSPADM

## 2025-04-28 RX ORDER — ACETAMINOPHEN 325 MG/1
650 TABLET ORAL
OUTPATIENT
Start: 2025-10-20

## 2025-04-28 ASSESSMENT — ROUTINE ASSESSMENT OF PATIENT INDEX DATA (RAPID3)
ON A SCALE OF ONE TO TEN, CONSIDERING ALL THE WAYS IN WHICH ILLNESS AND HEALTH CONDITIONS MAY AFFECT YOU AT THIS TIME, PLEASE INDICATE BELOW HOW YOU ARE DOING:: 4
ON A SCALE OF ONE TO TEN, HOW MUCH PAIN HAVE YOU HAD BECAUSE OF YOUR CONDITION OVER THE PAST WEEK?: 4
ON A SCALE OF ONE TO TEN, HOW MUCH OF A PROBLEM HAS UNUSUAL FATIGUE OR TIREDNESS BEEN FOR YOU OVER THE PAST WEEK?: 1
WHEN YOU AWAKENED IN THE MORNING OVER THE LAST WEEK, PLEASE INDICATE THE AMOUNT OF TIME IT TAKES UNTIL YOU ARE AS LIMBER AS YOU WILL BE FOR THE DAY: 30 MIN
ON A SCALE OF ONE TO TEN, HOW DIFFICULT WAS IT FOR YOU TO COMPLETE THE LISTED DAILY PHYSICAL TASKS OVER THE LAST WEEK: 0.0

## 2025-04-28 NOTE — PROGRESS NOTES
Since last visit patient has been feeling: Good     Having more problems with left hip, wakes her up at night with pain and the first couple of steps is a limp, hurts when she sits    Any Rheumatology medication side effects: None        Hospitalizations: None         Infections:  None      Vaccinations: None         4/28/2025     1:00 PM   DMARD/Biologic   AM Stiffness 30 min   Pain 4   Fatigue 1   MDHAQ 0   Patient Global Score 4          REVIEW OF SYSTEMS:  A total of 10 systems (musculoskeletal system as stated in the HPI and the following 9 systems) were reviewed with patient today and were negative except as stated in the HPI and except for the following (depicted with an \"X\"):        \"X\" Constitutional  \"X\" HEENT /Mouth  \"X\" Cardiovascular and  Respiratory (2 systems)  \"X\" Gastrointestinal    Fever/chills   Hair loss   Shortness of breath   Upset stomach    Falls   Dry mouth   Coughing  x Diarrhea / constipation    Wt loss   Mouth sores   Wheezing   Heartburn    Wt gain   Ringing ears   Chest pain   Dark or bloody stools    Night sweats   Diff. swallowing  X None of above   Nausea or vomiting   X None of above  X None of above      None of above                \"X\" Integumentary  \"X\" Neurological  \"X\" Genitourinary  \"X\" Psychiatric    Easy bruising  x Numbness/ tingling   Female problems   Depression    Rashes   Weakness   Problems with urination   Feeling anxious    Sun sensitivity   Headaches  X None of above  x Problems sleeping   X None of above   None of above      None of above           
activity plan:  As stated above.    Steroid management plan:  As stated above, if applicable.    Pain management plan:  As stated above, if applicable.    Weight management plan:  Weight loss through diet and exercise is always encouraged    Disease prognosis: Good        I appreciate the opportunity to continue to participate in the care of this patient.     This note was dictated using dragon voice recognition software.  It has been proofread, but there may still exist voice recognition errors that the author did not detect.      Total time spent on this encounter was 30-39 minutes independently interpreting results, ordering medication & tests, performing examination, documentation, and in care coordination.         [facility]  [address] [place], [place] [address] [phone]

## 2025-04-28 NOTE — PROGRESS NOTES
[facility] RHEUMATOLOGY  [address] [place] [address], [address]  [place] [address]  Office : [phone], Fax: [phone]       #3 Prolia 60mg/ml injected SC today into left arm. Patient tolerated injection well. Advised patient to continue with calcium and vitamin D post injection. Advised patient to call with any problems post injection.   Medication ordered by Dr. Mullen - pt escorted into infusion room by Dr. Mullen with approval to receive Prolia injection today    Patient Medication Supplied? no  Patient discharged feeling well and instructed to call the office with any issues.    Pre-infusion/injection questionairre for osteoporosis    1. Have you had or are you planning any dental work? No    2. Are you taking your calcium and vitamin D? Yes    3. When was your last osteoporosis treatment? 10/23/2024    4. Have you had any recent fractures? No    5. Are you currently in skilled nursing or in patient rehab? No

## 2025-04-28 NOTE — PATIENT INSTRUCTIONS
Prolia injection today  Get labs 1 month prior to every prolia injection  Keep your bone density scan for 5/2025  Continue calcium/vitamin d  Schedule PT for piriformis syndrome

## 2025-05-05 ENCOUNTER — HOSPITAL ENCOUNTER (OUTPATIENT)
Dept: MAMMOGRAPHY | Age: 65
Discharge: HOME | End: 2025-05-08
Attending: INTERNAL MEDICINE
Payer: COMMERCIAL

## 2025-05-05 DIAGNOSIS — M81.6 LOCALIZED OSTEOPOROSIS WITHOUT CURRENT PATHOLOGICAL FRACTURE: ICD-10-CM

## 2025-05-05 PROCEDURE — 77080 DXA BONE DENSITY AXIAL: CPT

## 2025-05-06 ENCOUNTER — HOSPITAL ENCOUNTER (OUTPATIENT)
Dept: PHYSICAL THERAPY | Age: 65
Setting detail: RECURRING SERIES
Discharge: HOME | End: 2025-05-09
Attending: INTERNAL MEDICINE
Payer: COMMERCIAL

## 2025-05-06 ENCOUNTER — RESULTS FOLLOW-UP (OUTPATIENT)
Dept: RHEUMATOLOGY | Age: 65
End: 2025-05-06

## 2025-05-06 DIAGNOSIS — G57.02 PIRIFORMIS SYNDROME, LEFT: Primary | ICD-10-CM

## 2025-05-06 DIAGNOSIS — M25.552 PAIN IN LEFT HIP: ICD-10-CM

## 2025-05-06 PROCEDURE — 97162 PT EVAL MOD COMPLEX 30 MIN: CPT

## 2025-05-06 PROCEDURE — 97110 THERAPEUTIC EXERCISES: CPT

## 2025-05-06 ASSESSMENT — PAIN SCALES - GENERAL: PAINLEVEL_OUTOF10: 1

## 2025-05-06 NOTE — PROGRESS NOTES
Alicia Landry  : 1960  Primary: [employer] ([place] [employer])  Secondary:  [facility] @ [address] Rosalind  [facility] [address]  Phone: [phone]  Fax: [phone] Plan Frequency: 2x/wk for 90 days    Plan of Care/Certification Expiration Date: 25        Plan of Care/Certification Expiration Date:  Plan of Care/Certification Expiration Date: 25    Frequency/Duration: Plan Frequency: 2x/wk for 90 days      Time In/Out:   Time In: 1345  Time Out: 1430      PT Visit Info:    Total # of Visits Approved: 75  Progress Note Due Date: 25  Total # of Visits to Date: 1  Progress Note Counter: 1      Visit Count:  1    OUTPATIENT PHYSICAL THERAPY:   Treatment Note 2025       Episode  (PT: Left Piriformis Syndrome)               Treatment Diagnosis:    Piriformis syndrome, left  Pain in left hip  Medical/Referring Diagnosis:    Piriformis syndrome, left    Referring Physician:  Gary Mullen DO MD Orders:  PT Eval and Treat   Return MD Appt:  10-29-25   Date of Onset:  Approximately 1 year ago  Allergies:   Pneumococcal vaccine and Codeine  Restrictions/Precautions:   None      Interventions Planned (Treatment may consist of any combination of the following):     See Assessment Note    Subjective Comments:   Pt reports L lateral hip and posterior buttock pain  Initial Pain Level:     1/10  Post Session Pain Level:      1/10  Medications Last Reviewed: 2025  Updated Objective Findings:  See Evaluation Note from today  Treatment   THERAPEUTIC EXERCISE: (8 minutes):    Exercises per grid below to improve mobility and strength.  Required minimal verbal cues to promote proper body alignment and promote proper body mechanics.  Progressed resistance and repetitions as indicated.   Date:  25 Date:   Date:     Activity/Exercise Parameters Parameters Parameters   Bridging 10 reps  5 sec holds  (HEP)     Theraband Clam Shells in Hook lying Yellow

## 2025-05-06 NOTE — THERAPY EVALUATION
Alicia STARK Frantz  : 1960  Primary: [employer] ([place] [employer])  Secondary:  [facility] @ [address] [place]ind  [facility] [address]  Phone: [phone]  Fax: [phone] Plan Frequency: 2x/wk for 90 days    Plan of Care/Certification Expiration Date: 25        Plan of Care/Certification Expiration Date:  Plan of Care/Certification Expiration Date: 25    Frequency/Duration: Plan Frequency: 2x/wk for 90 days      Time In/Out:   Time In: 1345  Time Out: 1430      PT Visit Info:    Total # of Visits Approved: 75  Progress Note Due Date: 25  Total # of Visits to Date: 1  Progress Note Counter: 1      Visit Count:  1                OUTPATIENT PHYSICAL THERAPY:             Initial Assessment 2025               Episode (PT: Left Piriformis Syndrome)         Treatment Diagnosis:     Piriformis syndrome, left  Pain in left hip  Medical/Referring Diagnosis:    Piriformis syndrome, left    Referring Physician:  Gary Mullen DO MD Orders:  PT Eval and Treat   Return MD Appt:  10-29-25  Date of Onset:    Approximately 1 year ago  Allergies:  Pneumococcal vaccine and Codeine  Restrictions/Precautions:    None      Medications Last Reviewed: 2025     SUBJECTIVE   History of Injury/Illness (Reason for Referral):  Pt reports back and L hip/buttock pain of approximately 1 year duration.  She states the pain is worst at night and wakes her up.  She had a trochanteric bursal injection in 2025 with reduction in her symptoms.  She takes Motrin prn for her back and hip pain.  Pt had lumbar and L hip x-rays which revealed:  \"AP pelvis and lateral views of hip.  There is no joint space loss, eburnated bone and osteophyte formation of the hip.  X-ray impression:  Normal bilateral hip may be some dysplasia noted  AP and lateral views of the lumbar spine reveal reveal normal appearing lumbar spine.  X-ray impression: Normal lumbar spine\" per MD

## 2025-05-13 ENCOUNTER — TELEPHONE (OUTPATIENT)
Dept: RHEUMATOLOGY | Age: 65
End: 2025-05-13

## 2025-05-13 ENCOUNTER — HOSPITAL ENCOUNTER (OUTPATIENT)
Dept: PHYSICAL THERAPY | Age: 65
Setting detail: RECURRING SERIES
Discharge: HOME | End: 2025-05-16
Attending: INTERNAL MEDICINE
Payer: COMMERCIAL

## 2025-05-13 PROCEDURE — 97035 APP MDLTY 1+ULTRASOUND EA 15: CPT

## 2025-05-13 PROCEDURE — 97140 MANUAL THERAPY 1/> REGIONS: CPT

## 2025-05-13 PROCEDURE — 97110 THERAPEUTIC EXERCISES: CPT

## 2025-05-13 ASSESSMENT — PAIN SCALES - GENERAL: PAINLEVEL_OUTOF10: 5

## 2025-05-13 NOTE — TELEPHONE ENCOUNTER
I spoke with pt and she will get it done through [facility], please put the order in for the MRI

## 2025-05-13 NOTE — PROGRESS NOTES
Alicia MI Landry  : 1960  Primary: [employer] ([place] [employer])  Secondary:  [facility] @ [address] [place]  [place] [address]  Phone: [phone]  Fax: [phone] Plan Frequency: 2x/wk for 90 days    Plan of Care/Certification Expiration Date: 25        Plan of Care/Certification Expiration Date:  Plan of Care/Certification Expiration Date: 25    Frequency/Duration: Plan Frequency: 2x/wk for 90 days      Time In/Out:   Time In: 1515  Time Out: 1600      PT Visit Info:    Total # of Visits Approved: 75  Progress Note Due Date: 25  Total # of Visits to Date: 2  Progress Note Counter: 2      Visit Count:  2    OUTPATIENT PHYSICAL THERAPY:   Treatment Note 2025       Episode  (PT: Left Piriformis Syndrome)               Treatment Diagnosis:    Piriformis syndrome, left  Pain in left hip  Medical/Referring Diagnosis:    Piriformis syndrome, left    Referring Physician:  Gary Mullen DO MD Orders:  PT Eval and Treat   Return MD Appt:  10-29-25   Date of Onset:  Approximately 1 year ago  Allergies:   Pneumococcal vaccine and Codeine  Restrictions/Precautions:   None      Interventions Planned (Treatment may consist of any combination of the following):     See Assessment Note    Subjective Comments:   Pt reports continued L hip pain.  She states since her x-ray showed some mild hip dysplasia she is wanting to see Dr Morrison, but they are requiring she have an MRI done first.  She is trying to get an MD to write her an order for MRI.  Initial Pain Level:     5/10  Post Session Pain Level:      5/10  Medications Last Reviewed: 2025  Updated Objective Findings:  None Today  Treatment   THERAPEUTIC EXERCISE: (20 minutes):    Exercises per grid below to improve mobility and strength.  Required minimal verbal cues to promote proper body alignment and promote proper body mechanics.  Progressed resistance and repetitions as indicated.

## 2025-05-19 ENCOUNTER — PROCEDURE VISIT (OUTPATIENT)
Dept: NEUROLOGY | Age: 65
End: 2025-05-19
Payer: COMMERCIAL

## 2025-05-19 DIAGNOSIS — G62.9 NEUROPATHY: Primary | ICD-10-CM

## 2025-05-19 PROCEDURE — 11104 PUNCH BX SKIN SINGLE LESION: CPT | Performed by: PSYCHIATRY & NEUROLOGY

## 2025-05-19 PROCEDURE — 11105 PUNCH BX SKIN EA SEP/ADDL: CPT | Performed by: PSYCHIATRY & NEUROLOGY

## 2025-05-19 NOTE — PROGRESS NOTES
Skin Biopsy Procedure Note:       Indication:        Consent: Written consent was completed     Assistant: MARIAELENA Montes        The patient was placed on an examining table to the supine side. Total 3 biopsy sites. The bilateral lower limb- The first site was at right foot above the EDB; the second site was at the same side of lateral lower leg 10 cm above the lateral malleolus. The third site the left foot above the EDB  All sites were steriled with Betadine swabs x 3, and then alcohol patch x 1.  2% Lidocaine was injected intra and subcutaneously in V shape to each site. The biopsy punch and other instrument were provided by [employer]. About 3-4 mm diameter and 4-5 mm depth of dermis/ epidermis was sampled from the above sites, and each placed into individual solution of a test tube provided by [employer]. The label of each tube was correctly matched with the sample. Minimal bleeding was easily stopped by pressure with sterile 4 x 4, then the each biopsy site was covered a wide Band-Aid.         Patient tolerated the procedure well.  Postprocedural instruction was given.  Patient was advised to call for any discomfort related to the procedure.         No

## 2025-05-20 ENCOUNTER — APPOINTMENT (OUTPATIENT)
Dept: PHYSICAL THERAPY | Age: 65
End: 2025-05-20
Attending: INTERNAL MEDICINE
Payer: COMMERCIAL

## 2025-05-20 NOTE — PROGRESS NOTES
HPI:  Ms. Landry is a 64 y.o.   OB History          0    Para        Term   0       0    AB   0    Living   0         SAB        IAB        Ectopic        Molar        Multiple        Live Births                 who is here today for a well woman exam. She complains of ***  MRI order in computer     Date Performed Result   PAP 22 Neg, HPV Neg   Mammogram 25 Benign   Colonoscopy 22 Int hemorrhoids   Dexa 25 10 year probability of major osteoporotic fracture: 11.4%  10 year probability of hip fracture: 2.5%         No obstetric history on file.        GYN History           No LMP recorded. Patient is postmenopausal. 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    Chronic tension-type headache, not intractable 2018    Colon polyp     Benign    Constipation     Cystic acne     spironolactone    GERD (gastroesophageal reflux disease) 2018    IBS (irritable bowel syndrome)     Insomnia     Low back pain     Mass of right inguinal region     Menopause     Muscle spasm     OA (osteoarthritis)     Other acne 2018    Ovarian cyst     Polyarthritis 2018    Associated with enteritis per pt report diagnosed in [place] by her PCP there    PONV (postoperative nausea and vomiting)     Postmenopausal     Rectocele     Ruptured appendix     Ruptured disk     Unspecified adverse effect of anesthesia     hard to wake up    Viral meningitis        Past Surgical History:  Past Surgical History:   Procedure Laterality Date    APPENDECTOMY  2013    BREAST BIOPSY Left 2021    stereo bx for calcs    CARPAL TUNNEL RELEASE      in [facility]    COLONOSCOPY  10/05/2015    Polyp- Benign    GYN      Hysterectomy     GYN      Endometrial biopsy, lap, D&C, hysteroscopy.    GYN  2015    dx lap 21ge, right

## 2025-05-26 SDOH — ECONOMIC STABILITY: FOOD INSECURITY: WITHIN THE PAST 12 MONTHS, YOU WORRIED THAT YOUR FOOD WOULD RUN OUT BEFORE YOU GOT MONEY TO BUY MORE.: PATIENT DECLINED

## 2025-05-26 SDOH — ECONOMIC STABILITY: INCOME INSECURITY: IN THE LAST 12 MONTHS, WAS THERE A TIME WHEN YOU WERE NOT ABLE TO PAY THE MORTGAGE OR RENT ON TIME?: PATIENT DECLINED

## 2025-05-26 SDOH — ECONOMIC STABILITY: FOOD INSECURITY: WITHIN THE PAST 12 MONTHS, THE FOOD YOU BOUGHT JUST DIDN'T LAST AND YOU DIDN'T HAVE MONEY TO GET MORE.: PATIENT DECLINED

## 2025-05-26 SDOH — ECONOMIC STABILITY: TRANSPORTATION INSECURITY
IN THE PAST 12 MONTHS, HAS THE LACK OF TRANSPORTATION KEPT YOU FROM MEDICAL APPOINTMENTS OR FROM GETTING MEDICATIONS?: PATIENT DECLINED

## 2025-05-28 NOTE — PROGRESS NOTES
HPI:  Ms. Landry is a 64 y.o.   OB History          0    Para   0    Term   0       0    AB   0    Living   0         SAB   0    IAB   0    Ectopic   0    Molar   0    Multiple   0    Live Births   0             who is here today for a well woman exam. She complains of nothing.   Requesting refills Vivelle 0.05 and Fioricet.   Pt is requesting a prescription for Cipro 500 mg daily #14 tablets for traveling.   Prolia doing well hip pain- doing PT   Date Performed Result   PAP 2022 Negative. HPV Negative.    Mammogram 2025  Benign   Colonoscopy 10/15/2022 Int hemorrhoids   Dexa 2025  Bone density is considered osteoporotic.          GYN History           No LMP recorded. Patient is postmenopausal.     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    Chronic tension-type headache, not intractable 2018    Colon polyp     Benign    Constipation     Cystic acne     spironolactone    GERD (gastroesophageal reflux disease) 2018    IBS (irritable bowel syndrome)     Insomnia     Low back pain     Mass of right inguinal region     Menopause     Muscle spasm     OA (osteoarthritis)     Other acne 2018    Ovarian cyst     Polyarthritis 2018    Associated with enteritis per pt report diagnosed in [place] by her PCP there    PONV (postoperative nausea and vomiting)     Postmenopausal     Rectocele     Ruptured appendix     Ruptured disk     Unspecified adverse effect of anesthesia     hard to wake up    Viral meningitis        Past Surgical History:  Past Surgical History:   Procedure Laterality Date    APPENDECTOMY  2013    BREAST BIOPSY Left 2021    stereo bx for calcs    BREAST SURGERY  2021    CARPAL TUNNEL RELEASE      in [place]    COLONOSCOPY  10/05/2015    Polyp- Benign    GYN      Hysterectomy     GYN      Endometrial

## 2025-05-29 ENCOUNTER — OFFICE VISIT (OUTPATIENT)
Dept: OBGYN CLINIC | Age: 65
End: 2025-05-29
Payer: COMMERCIAL

## 2025-05-29 ENCOUNTER — HOSPITAL ENCOUNTER (OUTPATIENT)
Dept: PHYSICAL THERAPY | Age: 65
Setting detail: RECURRING SERIES
End: 2025-05-29
Attending: INTERNAL MEDICINE
Payer: COMMERCIAL

## 2025-05-29 VITALS
DIASTOLIC BLOOD PRESSURE: 66 MMHG | WEIGHT: 128.1 LBS | BODY MASS INDEX: 20.59 KG/M2 | HEIGHT: 66 IN | SYSTOLIC BLOOD PRESSURE: 118 MMHG

## 2025-05-29 DIAGNOSIS — R23.2 HOT FLASHES: ICD-10-CM

## 2025-05-29 DIAGNOSIS — Z01.419 WELL WOMAN EXAM: Primary | ICD-10-CM

## 2025-05-29 DIAGNOSIS — Z12.31 SCREENING MAMMOGRAM FOR BREAST CANCER: ICD-10-CM

## 2025-05-29 DIAGNOSIS — Z13.89 SCREENING FOR GENITOURINARY CONDITION: ICD-10-CM

## 2025-05-29 LAB
BILIRUBIN, URINE, POC: NEGATIVE
BLOOD URINE, POC: NEGATIVE
GLUCOSE URINE, POC: NEGATIVE
KETONES, URINE, POC: NEGATIVE
LEUKOCYTE ESTERASE, URINE, POC: NEGATIVE
NITRITE, URINE, POC: NEGATIVE
PH, URINE, POC: 6 (ref 4.6–8)
PROTEIN,URINE, POC: NEGATIVE
SPECIFIC GRAVITY, URINE, POC: 1.01 (ref 1–1.03)
URINALYSIS CLARITY, POC: CLEAR
URINALYSIS COLOR, POC: YELLOW
UROBILINOGEN, POC: NORMAL MG/DL

## 2025-05-29 PROCEDURE — 97140 MANUAL THERAPY 1/> REGIONS: CPT

## 2025-05-29 PROCEDURE — 81002 URINALYSIS NONAUTO W/O SCOPE: CPT | Performed by: OBSTETRICS & GYNECOLOGY

## 2025-05-29 PROCEDURE — 97110 THERAPEUTIC EXERCISES: CPT

## 2025-05-29 PROCEDURE — 99396 PREV VISIT EST AGE 40-64: CPT | Performed by: OBSTETRICS & GYNECOLOGY

## 2025-05-29 PROCEDURE — 97035 APP MDLTY 1+ULTRASOUND EA 15: CPT

## 2025-05-29 RX ORDER — ESTRADIOL 10 UG/1
TABLET, FILM COATED VAGINAL
Qty: 18 TABLET | Refills: 3 | Status: SHIPPED | OUTPATIENT
Start: 2025-05-29

## 2025-05-29 RX ORDER — BUTALBITAL, ACETAMINOPHEN AND CAFFEINE 50; 325; 40 MG/1; MG/1; MG/1
1 TABLET ORAL EVERY 4 HOURS PRN
Qty: 30 TABLET | Refills: 5 | Status: SHIPPED | OUTPATIENT
Start: 2025-05-29

## 2025-05-29 RX ORDER — BUTALBITAL, ACETAMINOPHEN AND CAFFEINE 50; 325; 40 MG/1; MG/1; MG/1
1 TABLET ORAL EVERY 4 HOURS PRN
Qty: 30 TABLET | Refills: 5 | Status: SHIPPED | OUTPATIENT
Start: 2025-05-29 | End: 2025-05-29 | Stop reason: SDUPTHER

## 2025-05-29 RX ORDER — ESTRADIOL 0.05 MG/D
1 PATCH, EXTENDED RELEASE TRANSDERMAL
Qty: 24 PATCH | Refills: 4 | Status: SHIPPED | OUTPATIENT
Start: 2025-05-29

## 2025-05-29 RX ORDER — CIPROFLOXACIN 500 MG/1
500 TABLET, FILM COATED ORAL DAILY
Qty: 14 TABLET | Refills: 0 | Status: SHIPPED | OUTPATIENT
Start: 2025-05-29 | End: 2025-06-12

## 2025-05-29 ASSESSMENT — PAIN SCALES - GENERAL: PAINLEVEL_OUTOF10: 5

## 2025-05-29 NOTE — TELEPHONE ENCOUNTER
Pharmacy called regarding Fioricet prescription.   They are requesting that Dr. Lewis resent the prescription as it's now a controlled substance.   Will resend prescription.

## 2025-05-29 NOTE — PROGRESS NOTES
Alicia Landry  : 1960  Primary: [employer] ([place] [employer])  Secondary:  [facility] @ [address] [place]  [facility] [address]  Phone: [phone]  Fax: [phone] Plan Frequency: 2x/wk for 90 days    Plan of Care/Certification Expiration Date: 25        Plan of Care/Certification Expiration Date:  Plan of Care/Certification Expiration Date: 25    Frequency/Duration: Plan Frequency: 2x/wk for 90 days      Time In/Out:   Time In: 1517  Time Out: 1600      PT Visit Info:    Total # of Visits Approved: 75  Progress Note Due Date: 25  Total # of Visits to Date: 3  Progress Note Counter: 3      Visit Count:  3    OUTPATIENT PHYSICAL THERAPY:   Treatment Note 2025       Episode  (PT: Left Piriformis Syndrome)               Treatment Diagnosis:    Piriformis syndrome, left  Pain in left hip  Medical/Referring Diagnosis:    Piriformis syndrome, left    Referring Physician:  Gary Mullen DO MD Orders:  PT Eval and Treat   Return MD Appt:  10-29-25   Date of Onset:  Approximately 1 year ago  Allergies:   Pneumococcal vaccine and Codeine  Restrictions/Precautions:   None      Interventions Planned (Treatment may consist of any combination of the following):     See Assessment Note    Subjective Comments:   Pt states she had 2 days relief of pain after her last visit.   She states her insurance requires 6 weeks of PT before they will approve MRI.  Initial Pain Level:     5/10  Post Session Pain Level:      5/10  Medications Last Reviewed: 2025  Updated Objective Findings:  None Today  Treatment   THERAPEUTIC EXERCISE: (20 minutes):    Exercises per grid below to improve mobility and strength.  Required minimal verbal cues to promote proper body alignment and promote proper body mechanics.  Progressed resistance and repetitions as indicated.   Date:  25 Date:  25 Date:  25   Activity/Exercise Parameters Parameters  Parameters   Bridging 10 reps  5 sec holds  (HEP) 10 reps  5 sec holds 15 reps  5 sec holds   Theraband Clam Shells in Hook Lying Yellow T-band  15 reps  (HEP) Light Blue T-band  15 reps Blue T-band  20 reps   Piriformis Stretch 3 reps  15 sec holds  (HEP) Figure-4  3 reps  20 sec holds Figure-4  3 reps  20 sec holds   Hip Adduction with Ball  10 reps  5 sec holds 10 reps  5 sec holds   Side Lying Hip Abduction  15 reps  3 sec holds 15 reps  3 sec holds   Side Lying Clam Shell  15 reps  L LE 20 reps  L LE     MODALITIES:       *  Ultrasound was used today secondary to the patient having tightened structures limiting joint motion that require an increase in extensibility. Ultrasound was used today to reduce pain, increase muscle flexibility, and increase tendon flexibility.  To L lateral hip x8 minutes @1.2 W/cm2.  Skin clear afterwards.    MANUAL THERAPY: (15 minutes):   Soft tissue mobilization was utilized and necessary because of the patient's restricted motion of soft tissue.  To L IT Band and Piriformis in side lying.        Treatment/Session Summary:    Treatment Assessment:   Pt tolerated treatments well today.  She reported decreased pain at end of session.  Communication/Consultation:  None today  Equipment provided today:  None  Recommendations/Intent for next treatment session: Next visit will focus on progression of stretching/strengthening exercises as tolerable, manual therapy to L piriformis and IT Band, ultrasound to lateral L hip.    >Total Treatment Billable Duration:  43 minutes   Time In: 1517  Time Out: 1600     Ricco Chang PT         Charge Capture  Events  [facility] Portal  Appt Desk  Attendance Report     Future Appointments   Date Time Provider Department Center   6/2/2025 12:00 PM Nelida Saldana MD [facility]   6/2/2025  2:30 PM Diane Jang, ERIC [facility]   6/9/2025  1:00 PM Ricco Chang PT [facility]   10/7/2025  2:45 PM [facility] MRI UNIT 1 [facility]   10/29/2025  1:00

## 2025-06-02 ENCOUNTER — HOSPITAL ENCOUNTER (OUTPATIENT)
Dept: PHYSICAL THERAPY | Age: 65
Setting detail: RECURRING SERIES
Discharge: HOME | End: 2025-06-05
Attending: INTERNAL MEDICINE
Payer: COMMERCIAL

## 2025-06-02 ENCOUNTER — PATIENT MESSAGE (OUTPATIENT)
Dept: OBGYN CLINIC | Age: 65
End: 2025-06-02

## 2025-06-02 ENCOUNTER — OFFICE VISIT (OUTPATIENT)
Dept: NEUROLOGY | Age: 65
End: 2025-06-02
Payer: COMMERCIAL

## 2025-06-02 VITALS
SYSTOLIC BLOOD PRESSURE: 114 MMHG | BODY MASS INDEX: 20.57 KG/M2 | WEIGHT: 128 LBS | DIASTOLIC BLOOD PRESSURE: 76 MMHG | HEART RATE: 68 BPM | HEIGHT: 66 IN

## 2025-06-02 DIAGNOSIS — G62.9 NEUROPATHY: ICD-10-CM

## 2025-06-02 DIAGNOSIS — M79.2 NEUROPATHIC PAIN: Primary | ICD-10-CM

## 2025-06-02 PROCEDURE — 97110 THERAPEUTIC EXERCISES: CPT

## 2025-06-02 PROCEDURE — 99215 OFFICE O/P EST HI 40 MIN: CPT | Performed by: PSYCHIATRY & NEUROLOGY

## 2025-06-02 PROCEDURE — 97140 MANUAL THERAPY 1/> REGIONS: CPT

## 2025-06-02 PROCEDURE — 97035 APP MDLTY 1+ULTRASOUND EA 15: CPT

## 2025-06-02 RX ORDER — GABAPENTIN 300 MG/1
300 CAPSULE ORAL NIGHTLY
Qty: 90 CAPSULE | Refills: 1 | Status: SHIPPED | OUTPATIENT
Start: 2025-06-02 | End: 2025-11-29

## 2025-06-02 NOTE — PROGRESS NOTES
[facility] FOLLOW-UP NOTE    Patient: Alicia Landry  Physician: Nelida Saldana MD    CC:   Chief Complaint   Patient presents with    Follow-up       PCP: Sandhya Vo MD  Referring Provider: No ref. provider found    History of Present Illness:     Interval History on 6/2/2025:  History of Present Illness  Alicia Landry is 63 YO for follow-up of neuropathic pain and possible small fiber neuropathy.    She reports nightly escalation in pain, starting at 2:00 AM, disrupting sleep, described as pinching at the end of her toes. Rubbing exacerbates the pain; cold sheets are ineffective. She is concerned about drowsiness from gabapentin due to caregiving responsibilities for elderly parents. She questions the compatibility of gabapentin with Ativan, which she occasionally takes, including half a tablet last night for severe toe pain. She is considering lidocaine cream and seeks prescription clarification. She inquires about the safety of wine consumption while on gabapentin. She is intolerant to codeine, which causes sickness.    She is undergoing physical therapy for hip issues and received an injection for mild dysplasia. Neurosurgeon Dr. Vincent did not recommend intervention. Rheumatologist Dr. Mullen initiated physical therapy for suspected muscular issue. Her hips and toes are causing sleep disturbances. She prefers physical therapy over further injections. She leads an active lifestyle and is frustrated by health issues.    INTERVAL: Reports increased nightly pain since last visit.    FAMILY HISTORY  Mother: Bone problems  Father: Diabetes, nerve damage    MEDICATIONS  CURRENT MEDS:  Ativan Oral As needed  PREVIOUS MEDS:  Codeine - Caused sickness      Interval History on 1/29/2025:  Alicia Landry is a 64 y.o. right-handed female who presents for follow-up management of neuropathic pain and polyneuropathy-like symptoms. Patient has been doing well in terms of her neuropathy

## 2025-06-02 NOTE — PROGRESS NOTES
Alicia Landry  : 1960  Primary: [employer] ([place] [employer])  Secondary:  [facility] @ [address] [place] [place]  [facility] [address]  Phone: [phone]  Fax: [phone] Plan Frequency: 2x/wk for 90 days    Plan of Care/Certification Expiration Date: 25        Plan of Care/Certification Expiration Date:  Plan of Care/Certification Expiration Date: 25    Frequency/Duration: Plan Frequency: 2x/wk for 90 days      Time In/Out:   Time In: 1430  Time Out: 1513      PT Visit Info:    Total # of Visits Approved: 75  Progress Note Due Date: 25  Total # of Visits to Date: 4  Progress Note Counter: 4      Visit Count:  4    OUTPATIENT PHYSICAL THERAPY:   Treatment Note 2025       Episode  (PT: Left Piriformis Syndrome)               Treatment Diagnosis:    Piriformis syndrome, left  Pain in left hip  Medical/Referring Diagnosis:    Piriformis syndrome, left    Referring Physician:  Gary Mullen DO MD Orders:  PT Eval and Treat   Return MD Appt:  10-29-25   Date of Onset:  Approximately 1 year ago  Allergies:   Pneumococcal vaccine and Codeine  Restrictions/Precautions:   None      Interventions Planned (Treatment may consist of any combination of the following):     See Assessment Note    Subjective Comments:      She states her insurance requires 6 weeks of PT before they will approve MRI.  Initial Pain Level:   5   /10  Post Session Pain Level:   5    /10  Medications Last Reviewed: 2025  Updated Objective Findings:  None Today  Treatment   THERAPEUTIC EXERCISE: (32 minutes):    Exercises per grid below to improve mobility and strength.  Required minimal verbal cues to promote proper body alignment and promote proper body mechanics.  Progressed resistance and repetitions as indicated.   Date:  25   Activity/Exercise Parameters   Bridging 15 reps  5 sec holds   Theraband Clam Shells in Hook Lying Blue T-band  20 reps   Piriformis Stretch

## 2025-06-03 RX ORDER — ESTRADIOL 10 UG/1
TABLET, FILM COATED VAGINAL
Qty: 60 TABLET | Refills: 3 | Status: SHIPPED | OUTPATIENT
Start: 2025-06-03

## 2025-06-09 ENCOUNTER — HOSPITAL ENCOUNTER (OUTPATIENT)
Dept: PHYSICAL THERAPY | Age: 65
Setting detail: RECURRING SERIES
Discharge: HOME | End: 2025-06-12
Attending: INTERNAL MEDICINE
Payer: COMMERCIAL

## 2025-06-09 PROCEDURE — 97110 THERAPEUTIC EXERCISES: CPT

## 2025-06-09 PROCEDURE — 97140 MANUAL THERAPY 1/> REGIONS: CPT

## 2025-06-09 PROCEDURE — 97035 APP MDLTY 1+ULTRASOUND EA 15: CPT

## 2025-06-09 ASSESSMENT — PAIN SCALES - GENERAL: PAINLEVEL_OUTOF10: 5

## 2025-06-09 NOTE — PROGRESS NOTES
Alicia STARK Frantz  : 1960  Primary: [employer] ([place] [employer])  Secondary:  [facility] @ [address] [place] [place]  [facility] [address]  Phone: [phone]  Fax: [phone] Plan Frequency: 2x/wk for 90 days    Plan of Care/Certification Expiration Date: 25        Plan of Care/Certification Expiration Date:  Plan of Care/Certification Expiration Date: 25    Frequency/Duration: Plan Frequency: 2x/wk for 90 days      Time In/Out:   Time In: 1300  Time Out: 1345      PT Visit Info:    Total # of Visits Approved: 75  Progress Note Due Date: 25  Total # of Visits to Date: 5  Progress Note Counter: 1      Visit Count:  5                OUTPATIENT PHYSICAL THERAPY:             Progress Report 2025               Episode (PT: Left Piriformis Syndrome)         Treatment Diagnosis:     Piriformis syndrome, left  Pain in left hip  Medical/Referring Diagnosis:    Piriformis syndrome, left    Referring Physician:  Gary Mullen DO MD Orders:  PT Eval and Treat   Return MD Appt:  10-29-25  Date of Onset:    Approximately 1 year ago  Allergies:  Pneumococcal vaccine and Codeine  Restrictions/Precautions:    None      Medications Last Reviewed: 2025     SUBJECTIVE   History of Injury/Illness (Reason for Referral):  Pt reports back and L hip/buttock pain of approximately 1 year duration.  She states the pain is worst at night and wakes her up.  She had a trochanteric bursal injection in 2025 with reduction in her symptoms.  She takes Motrin prn for her back and hip pain.  Pt had lumbar and L hip x-rays which revealed:  \"AP pelvis and lateral views of hip.  There is no joint space loss, eburnated bone and osteophyte formation of the hip.  X-ray impression:  Normal bilateral hip may be some dysplasia noted  AP and lateral views of the lumbar spine reveal reveal normal appearing lumbar spine.  X-ray impression: Normal lumbar spine\" per MD note.  Pt 
  Piriformis Stretch Figure-4  3 reps  20 sec holds Figure-4  3 reps  20 sec holds   Hip Adduction with Ball 10 reps  5 sec holds 15 reps  5 sec holds   Side Lying Hip Abduction 15 reps  3 sec holds 15 reps  3 sec holds   Side Lying Clam Shell yellow 20 reps  L LE 15 reps  L LE     MODALITIES:       *  Ultrasound was used today secondary to the patient having tightened structures limiting joint motion that require an increase in extensibility. Ultrasound was used today to reduce pain, increase muscle flexibility, and increase tendon flexibility.  To L lateral hip x8 minutes @1.2 W/cm2.  Skin clear afterwards.    MANUAL THERAPY: (15 minutes):  Soft tissue mobilization was utilized and necessary because of the patient's restricted motion of soft tissue.  To L IT Band and Piriformis in side lying.        Treatment/Session Summary:    Treatment Assessment:   Pt tolerated treatments well today with no c/o increased pain.  Communication/Consultation:  None today  Equipment provided today:  None  Recommendations/Intent for next treatment session: Next visit will focus on progression of stretching/strengthening exercises as tolerable, manual therapy to L piriformis and IT Band, ultrasound to lateral L hip.    >Total Treatment Billable Duration:  43 minutes   Time In: 1300  Time Out: 1345     Ricco Chang PT         Charge Capture  Events  [employer] Portal  Appt Desk  Attendance Report     Future Appointments   Date Time Provider Department Center   6/17/2025  3:15 PM Ricco Chang PT [facility]   10/7/2025  2:45 PM [facility] MRI UNIT 1 [facility]   10/29/2025  1:00 PM Gary Mullen DO [facility]   10/29/2025  1:30 PM [facility]   12/3/2025  2:30 PM Nelida Saldana MD [facility]

## 2025-06-17 ENCOUNTER — HOSPITAL ENCOUNTER (OUTPATIENT)
Dept: PHYSICAL THERAPY | Age: 65
Setting detail: RECURRING SERIES
Discharge: HOME | End: 2025-06-20
Attending: INTERNAL MEDICINE
Payer: COMMERCIAL

## 2025-06-17 PROCEDURE — 97110 THERAPEUTIC EXERCISES: CPT

## 2025-06-17 PROCEDURE — 97140 MANUAL THERAPY 1/> REGIONS: CPT

## 2025-06-17 PROCEDURE — 97035 APP MDLTY 1+ULTRASOUND EA 15: CPT

## 2025-06-17 NOTE — PROGRESS NOTES
Alicia Landry  : 1960  Primary: [employer] ([place] [employer])  Secondary:  [facility] @ [address] [place] [place]  [facility] [address]  Phone: [phone]  Fax: [phone] Plan Frequency: 2x/wk for 90 days    Plan of Care/Certification Expiration Date: 25        Plan of Care/Certification Expiration Date:  Plan of Care/Certification Expiration Date: 25    Frequency/Duration: Plan Frequency: 2x/wk for 90 days      Time In/Out:   Time In: 1515  Time Out: 1600      PT Visit Info:    Total # of Visits Approved: 75  Progress Note Due Date: 25  Total # of Visits to Date: 6  Progress Note Counter: 2      Visit Count:  6    OUTPATIENT PHYSICAL THERAPY:   Treatment Note 2025       Episode  (PT: Left Piriformis Syndrome)               Treatment Diagnosis:    Piriformis syndrome, left  Pain in left hip  Medical/Referring Diagnosis:    Piriformis syndrome, left    Referring Physician:  Gary Mullen DO MD Orders:  PT Eval and Treat   Return MD Appt:  10-29-25   Date of Onset:  Approximately 1 year ago  Allergies:   Pneumococcal vaccine and Codeine  Restrictions/Precautions:   None      Interventions Planned (Treatment may consist of any combination of the following):     See Assessment Note    Subjective Comments:      Pt reports continued pain L hip.  Initial Pain Level:      not rated/10  Post Session Pain Level:       not rated/10  Medications Last Reviewed: 2025  Updated Objective Findings:  None Today  Treatment   THERAPEUTIC EXERCISE: (20 minutes):    Exercises per grid below to improve mobility and strength.  Required minimal verbal cues to promote proper body alignment and promote proper body mechanics.  Progressed resistance and repetitions as indicated.   Date:  25 Date:  25 Date:  25   Activity/Exercise Parameters     Bridging 15 reps  5 sec holds 15 reps  5 sec holds 15 reps  5 sec holds   Theraband Clam Shells in Hook

## 2025-06-18 DIAGNOSIS — M70.62 TROCHANTERIC BURSITIS OF LEFT HIP: Primary | ICD-10-CM

## 2025-06-18 DIAGNOSIS — M25.552 LEFT HIP PAIN: ICD-10-CM

## 2025-06-18 DIAGNOSIS — G57.02 PIRIFORMIS SYNDROME, LEFT: ICD-10-CM

## 2025-07-01 ENCOUNTER — HOSPITAL ENCOUNTER (OUTPATIENT)
Dept: MRI IMAGING | Age: 65
Discharge: HOME | End: 2025-07-04
Attending: INTERNAL MEDICINE
Payer: COMMERCIAL

## 2025-07-01 DIAGNOSIS — M25.552 LEFT HIP PAIN: ICD-10-CM

## 2025-07-01 DIAGNOSIS — M70.62 TROCHANTERIC BURSITIS OF LEFT HIP: ICD-10-CM

## 2025-07-01 DIAGNOSIS — G57.02 PIRIFORMIS SYNDROME, LEFT: ICD-10-CM

## 2025-07-01 PROCEDURE — 73721 MRI JNT OF LWR EXTRE W/O DYE: CPT

## 2025-07-02 ENCOUNTER — RESULTS FOLLOW-UP (OUTPATIENT)
Dept: RHEUMATOLOGY | Age: 65
End: 2025-07-02

## 2025-07-02 DIAGNOSIS — M19.90 INFLAMMATORY ARTHRITIS: Primary | ICD-10-CM

## 2025-07-14 ENCOUNTER — HOSPITAL ENCOUNTER (OUTPATIENT)
Dept: PHYSICAL THERAPY | Age: 65
Setting detail: RECURRING SERIES
Discharge: HOME | End: 2025-07-17
Attending: INTERNAL MEDICINE
Payer: COMMERCIAL

## 2025-07-14 DIAGNOSIS — M19.90 INFLAMMATORY ARTHRITIS: ICD-10-CM

## 2025-07-14 LAB
ALBUMIN SERPL-MCNC: 3.7 G/DL (ref 3.2–4.6)
ALBUMIN/GLOB SERPL: 1.3 (ref 1–1.9)
ALP SERPL-CCNC: 30 U/L (ref 35–104)
ALT SERPL-CCNC: 19 U/L (ref 8–45)
ANION GAP SERPL CALC-SCNC: 12 MMOL/L (ref 7–16)
AST SERPL-CCNC: 21 U/L (ref 15–37)
BASOPHILS # BLD: 0.07 K/UL (ref 0–0.2)
BASOPHILS NFR BLD: 1.1 % (ref 0–2)
BILIRUB SERPL-MCNC: 0.3 MG/DL (ref 0–1.2)
BUN SERPL-MCNC: 21 MG/DL (ref 8–23)
CALCIUM SERPL-MCNC: 9.4 MG/DL (ref 8.8–10.2)
CHLORIDE SERPL-SCNC: 101 MMOL/L (ref 98–107)
CO2 SERPL-SCNC: 27 MMOL/L (ref 20–29)
CREAT SERPL-MCNC: 0.82 MG/DL (ref 0.6–1.1)
CRP SERPL-MCNC: <0.3 MG/DL (ref 0–0.4)
DIFFERENTIAL METHOD BLD: NORMAL
EOSINOPHIL # BLD: 0.23 K/UL (ref 0–0.8)
EOSINOPHIL NFR BLD: 3.7 % (ref 0.5–7.8)
ERYTHROCYTE [DISTWIDTH] IN BLOOD BY AUTOMATED COUNT: 14.6 % (ref 11.9–14.6)
ERYTHROCYTE [SEDIMENTATION RATE] IN BLOOD: 1 MM/HR (ref 0–30)
GLOBULIN SER CALC-MCNC: 2.9 G/DL (ref 2.3–3.5)
GLUCOSE SERPL-MCNC: 108 MG/DL (ref 70–99)
HCT VFR BLD AUTO: 40.4 % (ref 35.8–46.3)
HGB BLD-MCNC: 13.1 G/DL (ref 11.7–15.4)
IMM GRANULOCYTES # BLD AUTO: 0.02 K/UL (ref 0–0.5)
IMM GRANULOCYTES NFR BLD AUTO: 0.3 % (ref 0–5)
LYMPHOCYTES # BLD: 1.49 K/UL (ref 0.5–4.6)
LYMPHOCYTES NFR BLD: 24 % (ref 13–44)
MCH RBC QN AUTO: 30.8 PG (ref 26.1–32.9)
MCHC RBC AUTO-ENTMCNC: 32.4 G/DL (ref 31.4–35)
MCV RBC AUTO: 94.8 FL (ref 82–102)
MONOCYTES # BLD: 0.61 K/UL (ref 0.1–1.3)
MONOCYTES NFR BLD: 9.8 % (ref 4–12)
NEUTS SEG # BLD: 3.78 K/UL (ref 1.7–8.2)
NEUTS SEG NFR BLD: 61.1 % (ref 43–78)
NRBC # BLD: 0 K/UL (ref 0–0.2)
PLATELET # BLD AUTO: 202 K/UL (ref 150–450)
PMV BLD AUTO: 11.2 FL (ref 9.4–12.3)
POTASSIUM SERPL-SCNC: 4.2 MMOL/L (ref 3.5–5.1)
PROT SERPL-MCNC: 6.6 G/DL (ref 6.3–8.2)
RBC # BLD AUTO: 4.26 M/UL (ref 4.05–5.2)
SODIUM SERPL-SCNC: 139 MMOL/L (ref 136–145)
WBC # BLD AUTO: 6.2 K/UL (ref 4.3–11.1)

## 2025-07-14 PROCEDURE — 97110 THERAPEUTIC EXERCISES: CPT

## 2025-07-14 NOTE — PROGRESS NOTES
Alicia Landry  : 1960  Primary: [employer] ([place] [employer])  Secondary:  [facility] @ [address] [place] [place]  [place] [address]  Phone: [phone]  Fax: [phone] Plan Frequency: 2x/wk for 90 days    Plan of Care/Certification Expiration Date: 25        Plan of Care/Certification Expiration Date:  Plan of Care/Certification Expiration Date: 25    Frequency/Duration: Plan Frequency: 2x/wk for 90 days      Time In/Out:   Time In: 1525  Time Out: 1600      PT Visit Info:    Total # of Visits Approved: 75  Progress Note Due Date: 25  Total # of Visits to Date: 7  Progress Note Counter: 3      Visit Count:  7    OUTPATIENT PHYSICAL THERAPY:   Treatment Note 2025       Episode  (PT: Left Piriformis Syndrome)               Treatment Diagnosis:    Piriformis syndrome, left  Pain in left hip  Medical/Referring Diagnosis:    Piriformis syndrome, left    Referring Physician:  Gary Mullen DO MD Orders:  PT Eval and Treat   Return MD Appt:  10-29-25   Date of Onset:  Approximately 1 year ago  Allergies:   Pneumococcal vaccine and Codeine  Restrictions/Precautions:   None      Interventions Planned (Treatment may consist of any combination of the following):     See Assessment Note    Subjective Comments:      Pt states her MRI revealed a labral tear and she has an appt scheduled with Dr Morrison.  Initial Pain Level:      not rated/10  Post Session Pain Level:       not rated/10  Medications Last Reviewed: 2025  Updated Objective Findings:  None Today  Treatment   THERAPEUTIC EXERCISE: (35 minutes):    Exercises per grid below to improve mobility and strength.  Required minimal verbal cues to promote proper body alignment and promote proper body mechanics.  Progressed resistance and repetitions as indicated.   Date:  25 Date:  25 Date:  25   Activity/Exercise      Bridging 15 reps  5 sec holds 15 reps  5 sec holds 15 reps  5

## 2025-07-15 LAB
CCP IGA+IGG SERPL IA-ACNC: 5 UNITS (ref 0–19)
RHEUMATOID FACT SERPL-ACNC: 10.8 IU/ML

## 2025-07-21 LAB — HLA-B27 QL NAA+PROBE: NEGATIVE

## (undated) DEVICE — SOLUTION IRRIG 1000ML 0.9% SOD CHL USP POUR PLAS BTL

## (undated) DEVICE — NEEDLE HYPO 25GA L1.5IN BLU POLYPR HUB S STL REG BVL STR

## (undated) DEVICE — SUTURE VCRL SZ 3-0 L18IN ABSRB UD L26MM SH 1/2 CIR J864D

## (undated) DEVICE — SUTURE VCRL SZ 3-0 L27IN ABSRB UD L26MM SH 1/2 CIR J416H

## (undated) DEVICE — GLOVE SURG SZ 7 L12IN FNGR THK79MIL GRN LTX FREE

## (undated) DEVICE — GLOVE ORANGE PI 7   MSG9070

## (undated) DEVICE — STRIP,CLOSURE,WOUND,MEDI-STRIP,1/2X4: Brand: MEDLINE

## (undated) DEVICE — MINOR SPLIT GENERAL: Brand: MEDLINE INDUSTRIES, INC.

## (undated) DEVICE — MASTISOL ADHESIVE LIQ 2/3ML